# Patient Record
Sex: MALE | Race: BLACK OR AFRICAN AMERICAN | NOT HISPANIC OR LATINO | Employment: UNEMPLOYED | URBAN - METROPOLITAN AREA
[De-identification: names, ages, dates, MRNs, and addresses within clinical notes are randomized per-mention and may not be internally consistent; named-entity substitution may affect disease eponyms.]

---

## 2022-05-08 ENCOUNTER — HOSPITAL ENCOUNTER (EMERGENCY)
Facility: HOSPITAL | Age: 32
Discharge: HOME | End: 2022-05-09
Attending: EMERGENCY MEDICINE
Payer: MEDICAID

## 2022-05-08 DIAGNOSIS — K52.9 PROCTOCOLITIS: ICD-10-CM

## 2022-05-08 DIAGNOSIS — B20 HIV INFECTION, UNSPECIFIED SYMPTOM STATUS: Primary | ICD-10-CM

## 2022-05-08 PROBLEM — Z21 HIV INFECTION: Status: ACTIVE | Noted: 2022-05-08

## 2022-05-08 LAB
ALBUMIN SERPL-MCNC: 3.4 GM/DL (ref 3.5–5)
ALBUMIN/GLOB SERPL: 0.9 RATIO (ref 1.1–2)
ALP SERPL-CCNC: 45 UNIT/L (ref 40–150)
ALT SERPL-CCNC: 17 UNIT/L (ref 0–55)
ANISOCYTOSIS BLD QL SMEAR: ABNORMAL
APPEARANCE UR: CLEAR
AST SERPL-CCNC: 18 UNIT/L (ref 5–34)
BACTERIA #/AREA URNS AUTO: ABNORMAL /HPF
BASOPHILS # BLD AUTO: 0.01 X10(3)/MCL (ref 0–0.2)
BASOPHILS NFR BLD AUTO: 0.1 %
BILIRUB UR QL STRIP.AUTO: NEGATIVE MG/DL
BILIRUBIN DIRECT+TOT PNL SERPL-MCNC: 0.2 MG/DL (ref 0–0.5)
BILIRUBIN DIRECT+TOT PNL SERPL-MCNC: 0.2 MG/DL (ref 0–0.8)
BILIRUBIN DIRECT+TOT PNL SERPL-MCNC: 0.4 MG/DL
BUN SERPL-MCNC: 12.8 MG/DL (ref 8.9–20.6)
C TRACH DNA SPEC QL NAA+PROBE: NOT DETECTED
CALCIUM SERPL-MCNC: 9.1 MG/DL (ref 8.4–10.2)
CHLORIDE SERPL-SCNC: 109 MMOL/L (ref 98–107)
CO2 SERPL-SCNC: 27 MMOL/L (ref 22–29)
COLOR UR AUTO: YELLOW
CREAT SERPL-MCNC: 1.13 MG/DL (ref 0.73–1.18)
EOSINOPHIL # BLD AUTO: 0.3 X10(3)/MCL (ref 0–0.9)
EOSINOPHIL NFR BLD AUTO: 3.3 %
ERYTHROCYTE [DISTWIDTH] IN BLOOD BY AUTOMATED COUNT: 18.9 % (ref 11.5–17)
GLOBULIN SER-MCNC: 3.7 GM/DL (ref 2.4–3.5)
GLUCOSE SERPL-MCNC: 83 MG/DL (ref 74–100)
GLUCOSE UR QL STRIP.AUTO: NEGATIVE MG/DL
HCT VFR BLD AUTO: 43.3 % (ref 42–52)
HGB BLD-MCNC: 13.6 GM/DL (ref 14–18)
HIV 1+2 AB+HIV1 P24 AG SERPL QL IA: REACTIVE
IMM GRANULOCYTES # BLD AUTO: 0.03 X10(3)/MCL (ref 0–0.02)
IMM GRANULOCYTES NFR BLD AUTO: 0.3 % (ref 0–0.43)
KETONES UR QL STRIP.AUTO: NEGATIVE MG/DL
LEUKOCYTE ESTERASE UR QL STRIP.AUTO: ABNORMAL UNIT/L
LYMPHOCYTES # BLD AUTO: 2.89 X10(3)/MCL (ref 0.6–4.6)
LYMPHOCYTES NFR BLD AUTO: 31.3 %
MCH RBC QN AUTO: 22.5 PG (ref 27–31)
MCHC RBC AUTO-ENTMCNC: 31.4 MG/DL (ref 33–36)
MCV RBC AUTO: 71.7 FL (ref 80–94)
MICROCYTES BLD QL SMEAR: ABNORMAL
MONOCYTES # BLD AUTO: 0.96 X10(3)/MCL (ref 0.1–1.3)
MONOCYTES NFR BLD AUTO: 10.4 %
N GONORRHOEA DNA SPEC QL NAA+PROBE: NOT DETECTED
NEUTROPHILS # BLD AUTO: 5 X10(3)/MCL (ref 2.1–9.2)
NEUTROPHILS NFR BLD AUTO: 54.6 %
NITRITE UR QL STRIP.AUTO: NEGATIVE
NRBC BLD AUTO-RTO: 0 %
PH UR STRIP.AUTO: 6 [PH]
PLATELET # BLD AUTO: 228 X10(3)/MCL (ref 130–400)
PLATELET # BLD EST: ADEQUATE 10*3/UL
PMV BLD AUTO: 10.9 FL (ref 9.4–12.4)
POIKILOCYTOSIS BLD QL SMEAR: ABNORMAL
POTASSIUM SERPL-SCNC: 4.9 MMOL/L (ref 3.5–5.1)
PROT SERPL-MCNC: 7.1 GM/DL (ref 6.4–8.3)
PROT UR QL STRIP.AUTO: NEGATIVE MG/DL
RBC # BLD AUTO: 6.04 X10(6)/MCL (ref 4.7–6.1)
RBC #/AREA URNS AUTO: ABNORMAL /HPF
RBC MORPH BLD: ABNORMAL
RBC UR QL AUTO: NEGATIVE UNIT/L
RPR SER QL: ABNORMAL
RPR SER QL: REACTIVE
RPR SER-TITR: ABNORMAL {TITER}
SODIUM SERPL-SCNC: 142 MMOL/L (ref 136–145)
SP GR UR STRIP.AUTO: 1.01 (ref 1–1.03)
SQUAMOUS #/AREA URNS AUTO: ABNORMAL /LPF
TARGETS BLD QL SMEAR: ABNORMAL
UROBILINOGEN UR STRIP-ACNC: 1 MG/DL
WBC # SPEC AUTO: 9.2 X10(3)/MCL (ref 4.5–11.5)
WBC #/AREA URNS AUTO: >=100 /HPF

## 2022-05-08 PROCEDURE — 87591 N.GONORRHOEAE DNA AMP PROB: CPT | Performed by: PHYSICIAN ASSISTANT

## 2022-05-08 PROCEDURE — 86702 HIV-2 ANTIBODY: CPT | Performed by: EMERGENCY MEDICINE

## 2022-05-08 PROCEDURE — 99285 EMERGENCY DEPT VISIT HI MDM: CPT | Mod: 25

## 2022-05-08 PROCEDURE — 87088 URINE BACTERIA CULTURE: CPT | Performed by: PHYSICIAN ASSISTANT

## 2022-05-08 PROCEDURE — 81003 URINALYSIS AUTO W/O SCOPE: CPT | Performed by: PHYSICIAN ASSISTANT

## 2022-05-08 PROCEDURE — 86592 SYPHILIS TEST NON-TREP QUAL: CPT | Performed by: EMERGENCY MEDICINE

## 2022-05-08 PROCEDURE — 36415 COLL VENOUS BLD VENIPUNCTURE: CPT | Performed by: PHYSICIAN ASSISTANT

## 2022-05-08 PROCEDURE — 87491 CHLMYD TRACH DNA AMP PROBE: CPT | Performed by: PHYSICIAN ASSISTANT

## 2022-05-08 PROCEDURE — 85025 COMPLETE CBC W/AUTO DIFF WBC: CPT | Performed by: PHYSICIAN ASSISTANT

## 2022-05-08 PROCEDURE — 36415 COLL VENOUS BLD VENIPUNCTURE: CPT | Performed by: EMERGENCY MEDICINE

## 2022-05-08 PROCEDURE — 80053 COMPREHEN METABOLIC PANEL: CPT | Performed by: PHYSICIAN ASSISTANT

## 2022-05-08 PROCEDURE — 25500020 PHARM REV CODE 255: Performed by: EMERGENCY MEDICINE

## 2022-05-08 PROCEDURE — 81015 MICROSCOPIC EXAM OF URINE: CPT | Performed by: PHYSICIAN ASSISTANT

## 2022-05-08 PROCEDURE — 87389 HIV-1 AG W/HIV-1&-2 AB AG IA: CPT | Performed by: EMERGENCY MEDICINE

## 2022-05-08 RX ADMIN — IOPAMIDOL 100 ML: 755 INJECTION, SOLUTION INTRAVENOUS at 09:05

## 2022-05-08 NOTE — FIRST PROVIDER EVALUATION
"Medical screening exam completed.  I have conducted a focused provider triage encounter, findings are as follows:    Chief Complaint   Patient presents with    Rectal Bleeding     Rectal pain and bleeding x2 weeks. States mainly when he has a BM, but will occasionally just pass clots.  Went to an UC and was told he didn't have hemorrhoids. No blood thinner use.       Brief history of present illness:  31 y.o. male presents to the ED with rectal pain and bleeding for the last 6 weeks. States he was seen at [facility] 3 weeks ago, and told he didn't have hemorrhoids. Notes pain with each bowel movement as well as intermittent dysuria, says the pain is radiating to both LE. Denies fever, chills. FERMIN Thomas    Vitals:    05/08/22 1825   BP: 129/69   BP Location: Left arm   Patient Position: Sitting   Pulse: 77   Resp: 18   Temp: 98.6 °F (37 °C)   TempSrc: Oral   SpO2: 99%   Height: 6' 2" (1.88 m)       Pertinent physical exam: Awake, alert, ambulatory, non-labored respirations    Brief workup plan:  Labs, UA    Preliminary workup initiated; this workup will be continued and followed by the physician or advanced practice provider that is assigned to the patient when roomed.  "

## 2022-05-09 VITALS
HEIGHT: 74 IN | TEMPERATURE: 99 F | SYSTOLIC BLOOD PRESSURE: 132 MMHG | HEART RATE: 63 BPM | RESPIRATION RATE: 18 BRPM | DIASTOLIC BLOOD PRESSURE: 67 MMHG | OXYGEN SATURATION: 97 %

## 2022-05-09 PROCEDURE — 63600175 PHARM REV CODE 636 W HCPCS: Mod: JG | Performed by: EMERGENCY MEDICINE

## 2022-05-09 PROCEDURE — 96372 THER/PROPH/DIAG INJ SC/IM: CPT | Performed by: EMERGENCY MEDICINE

## 2022-05-09 RX ORDER — CIPROFLOXACIN 500 MG/1
500 TABLET ORAL EVERY 12 HOURS
Qty: 28 TABLET | Refills: 0 | Status: SHIPPED | OUTPATIENT
Start: 2022-05-09 | End: 2022-05-23

## 2022-05-09 RX ORDER — METRONIDAZOLE 500 MG/1
500 TABLET ORAL EVERY 12 HOURS
Qty: 28 TABLET | Refills: 0 | Status: SHIPPED | OUTPATIENT
Start: 2022-05-09 | End: 2022-05-23

## 2022-05-09 RX ADMIN — PENICILLIN G BENZATHINE 2.4 MILLION UNITS: 1200000 INJECTION, SUSPENSION INTRAMUSCULAR at 01:05

## 2022-05-09 NOTE — ED PROVIDER NOTES
Encounter Date: 5/8/2022       History     Chief Complaint   Patient presents with    Rectal Bleeding     Rectal pain and bleeding x6 weeks. States mainly when he has a BM, but will occasionally just pass clots.  Went to an UC and was told he didn't have hemorrhoids. No blood thinner use.     32 yo AAM c/o rectal bleeding for about 6 weeks, as well as pain radiating down his right leg, and generalized weakness.  Pt does have MSM, receives rectal intercourse, unprotected, has a hx of syphillis but is unsure if it has been treated or not.  On exam - benign neuro, no obvious rectal bleeding or external hemorrhoids.  Pt maew.          Review of patient's allergies indicates:  No Known Allergies  History reviewed. No pertinent past medical history.  History reviewed. No pertinent surgical history.  No family history on file.  Social History     Tobacco Use    Smoking status: Light Tobacco Smoker     Types: Cigarettes    Smokeless tobacco: Never Used   Substance Use Topics    Alcohol use: Never    Drug use: Never     Review of Systems   Constitutional: Negative for fever.   HENT: Negative for sore throat.    Respiratory: Negative for shortness of breath.    Cardiovascular: Negative for chest pain.   Gastrointestinal: Negative for nausea.   Genitourinary: Negative for dysuria.   Musculoskeletal: Negative for back pain.   Skin: Negative for rash.   Neurological: Positive for weakness.   Hematological: Does not bruise/bleed easily.       Physical Exam     Initial Vitals [05/08/22 1825]   BP Pulse Resp Temp SpO2   129/69 77 18 98.6 °F (37 °C) 99 %      MAP       --         Physical Exam    Constitutional: He appears well-developed and well-nourished. No distress.   HENT:   Head: Normocephalic and atraumatic.   Eyes: Conjunctivae and EOM are normal. Pupils are equal, round, and reactive to light. Right eye exhibits no discharge. Left eye exhibits no discharge. No scleral icterus.   Neck: No tracheal deviation present.    Cardiovascular: Normal rate, regular rhythm, normal heart sounds and intact distal pulses.   No murmur heard.  Pulmonary/Chest: Breath sounds normal. No stridor. No respiratory distress. He has no wheezes. He has no rales.   Abdominal: Abdomen is soft. He exhibits no distension. There is no abdominal tenderness. There is no rebound and no guarding.   Musculoskeletal:         General: No tenderness or edema. Normal range of motion.     Neurological: He is alert and oriented to person, place, and time. He has normal strength and normal reflexes. No cranial nerve deficit.   Skin: Skin is warm and dry. No rash noted. No erythema. No pallor.   Psychiatric: He has a normal mood and affect. His behavior is normal. Judgment and thought content normal.         ED Course   Procedures  Labs Reviewed   COMPREHENSIVE METABOLIC PANEL - Abnormal; Notable for the following components:       Result Value    Chloride 109 (*)     Albumin Level 3.4 (*)     Globulin 3.7 (*)     Albumin/Globulin Ratio 0.9 (*)     All other components within normal limits   URINALYSIS, REFLEX TO URINE CULTURE - Abnormal; Notable for the following components:    Leukocyte Esterase, UA Large (*)     All other components within normal limits   CBC WITH DIFFERENTIAL - Abnormal; Notable for the following components:    Hgb 13.6 (*)     MCV 71.7 (*)     MCH 22.5 (*)     MCHC 31.4 (*)     RDW 18.9 (*)     IG# 0.03 (*)     All other components within normal limits   BLOOD SMEAR MICROSCOPIC EXAM (OLG) - Abnormal; Notable for the following components:    RBC Morph Abnormal (*)     Anisocyte 1+ (*)     Microcyte 1+ (*)     Poik 1+ (*)     Target Cell 1+ (*)     All other components within normal limits   URINALYSIS, MICROSCOPIC - Abnormal; Notable for the following components:    WBC, UA >=100 (*)     Bacteria, UA Few (*)     All other components within normal limits   RPR - Abnormal; Notable for the following components:    RPR Reactive (*)     RPR Titer 1:32 (*)      All other components within normal limits   HIV 1 / 2 ANTIBODY - Abnormal; Notable for the following components:    HIV Reactive (*)     All other components within normal limits   CHLAMYDIA/GONORRHOEAE(GC), PCR - Normal   CULTURE, URINE   CBC W/ AUTO DIFFERENTIAL    Narrative:     The following orders were created for panel order CBC auto differential.  Procedure                               Abnormality         Status                     ---------                               -----------         ------                     CBC with Differential[[id number]]        Abnormal            Final result                 Please view results for these tests on the individual orders.   HIV 1/2 ANTIBODY DIFFERENTIATION          Imaging Results          CT Abdomen Pelvis With Contrast (Preliminary result)  Result time 05/08/22 21:07:44   Procedure changed from CTA Abdomen and Pelvis     Preliminary result by Roldan Ponce MD (05/08/22 21:07:44)                 Narrative:    START OF REPORT:  Technique: CT of the abdomen and pelvis was performed with axial images as well as sagittal and coronal reconstruction images with intravenous contrast.    Comparison: None available.    Clinical History: Rectal bleeding, MSM, Rectal abscess.    Dosage Information: Automated Exposure Control was utilized.    Findings:  Lines and Tubes: None.  Thorax:  Lungs: The visualized lung bases appear unremarkable.  Pleura: No effusions or thickening are seen. No pneumothorax is seen in the visualized lung bases.  Heart: The heart size is within normal limits.  Abdomen:  Abdominal Wall: No abdominal wall pathology is seen.  Liver: The liver appears unremarkable.  Biliary System: No intrahepatic or extrahepatic biliary duct dilatation is seen.  Gallbladder: The gallbladder appears unremarkable.  Pancreas: The pancreas appears unremarkable.  Spleen: The spleen appears unremarkable.  Adrenals: The adrenal glands appear unremarkable.  Kidneys: The  kidneys appear unremarkable with no stones cysts masses or hydronephrosis.  Aorta: The abdominal aorta appears unremarkable.  IVC: Unremarkable.  Bowel:  Esophagus: The visualized distal esophagus appears unremarkable.  Stomach: The stomach is nondistended limiting the evaluation for wall thickening.  Duodenum: Unremarkable appearing duodenum.  Small Bowel: The small bowel appears unremarkable.  Colon: There is mild circumferential wall thickening of the inferior rectum and anal canal. There are a few associatedprominent lymph nodes (series 2, images 76-83).  Appendix: The appendix appears unremarkable and is seen on âImage 40, Series 2â through âImage 51, Series 2â.  Peritoneum: No intraperitoneal free air or ascites is seen.    Pelvis:  Bladder: The bladder is nondistended however the bladder wall appears thickened after considering state of nondistension which could reflect an element of cystitis.  Male:  Prostate gland: The prostate gland appears unremarkable.  Inguinal Findings: Incidental note is made of a few prominent inguinal lymph nodes bilaterally.      Impression:  1. There is mild circumferential wall thickening of the inferior rectum and anal canal. There are a few associatedprominent lymph nodes (series 2, images 76-83). These findings suggest possible proctocolitis without definitive abscess formation identified on this study. Correlate clinically as regards additional evaluation and follow up.  2. The bladder is nondistended however the bladder wall appears thickened after considering state of nondistension which could reflect an element of cystitis. Correlate with clinical and laboratory findings.  3. Details and other findings as discussed above.                                X-Rays:   Independently Interpreted Readings:   Other Readings:  START OF REPORT:  Technique: CT of the abdomen and pelvis was performed with axial images as well as sagittal and coronal reconstruction images with  intravenous contrast.     Comparison: None available.     Clinical History: Rectal bleeding, MSM, Rectal abscess.     Dosage Information: Automated Exposure Control was utilized.     Findings:  Lines and Tubes: None.  Thorax:  Lungs: The visualized lung bases appear unremarkable.  Pleura: No effusions or thickening are seen. No pneumothorax is seen in the visualized lung bases.  Heart: The heart size is within normal limits.  Abdomen:  Abdominal Wall: No abdominal wall pathology is seen.  Liver: The liver appears unremarkable.  Biliary System: No intrahepatic or extrahepatic biliary duct dilatation is seen.  Gallbladder: The gallbladder appears unremarkable.  Pancreas: The pancreas appears unremarkable.  Spleen: The spleen appears unremarkable.  Adrenals: The adrenal glands appear unremarkable.  Kidneys: The kidneys appear unremarkable with no stones cysts masses or hydronephrosis.  Aorta: The abdominal aorta appears unremarkable.  IVC: Unremarkable.  Bowel:  Esophagus: The visualized distal esophagus appears unremarkable.  Stomach: The stomach is nondistended limiting the evaluation for wall thickening.  Duodenum: Unremarkable appearing duodenum.  Small Bowel: The small bowel appears unremarkable.  Colon: There is mild circumferential wall thickening of the inferior rectum and anal canal. There are a few associatedprominent lymph nodes (series 2, images 76-83).  Appendix: The appendix appears unremarkable and is seen on âImage 40, Series 2â through âImage 51, Series 2â.  Peritoneum: No intraperitoneal free air or ascites is seen.     Pelvis:  Bladder: The bladder is nondistended however the bladder wall appears thickened after considering state of nondistension which could reflect an element of cystitis.  Male:  Prostate gland: The prostate gland appears unremarkable.  Inguinal Findings: Incidental note is made of a few prominent inguinal lymph nodes bilaterally.        Impression:  1. There is mild  circumferential wall thickening of the inferior rectum and anal canal. There are a few associatedprominent lymph nodes (series 2, images 76-83). These findings suggest possible proctocolitis without definitive abscess formation identified on this study. Correlate clinically as regards additional evaluation and follow up.  2. The bladder is nondistended however the bladder wall appears thickened after considering state of nondistension which could reflect an element of cystitis. Correlate with clinical and laboratory findings.  3. Details and other findings as discussed above.        This result has not been signed. Information might be incomplete.          Exam Ended: 05/08/22 21:07 Last Resulted: 05/08/22 21:07            Medications   penicillin G benzathine (BICILLIN LA) injection 2.4 Million Units (has no administration in time range)   iopamidoL (ISOVUE-370) injection 100 mL (100 mLs Intravenous Given 5/8/22 2111)     Medical Decision Making:   I spoke with the hospitalist Dr. Milner about this patient's new diagnosis of HIV and also his history of possibly untreated syphilis they recommend Bicillin 2.4 million units IM now and did not start anti-retroviral treatment right now due to the risk of resistance.  I will leave a consult for the morning for case management to get him emergently into the clinic at [facility] for follow-up and the start of his anti-retroviral treatment.  Will also treat him with Cipro and Flagyl for possible proctocolitis.  I did advise him of safe sex practices that he should inform any partners that he has recently had that they need to be checked for HIV and sexually transmitted disease.                      Clinical Impression:   Final diagnoses:  [B20] HIV infection, unspecified symptom status (Primary)  [K52.9] Proctocolitis          ED Disposition Condition    Discharge Stable        ED Prescriptions     Medication Sig Dispense Start Date End Date Auth. Provider    ciprofloxacin HCl  (CIPRO) 500 MG tablet Take 1 tablet (500 mg total) by mouth every 12 (twelve) hours. for 14 days 28 tablet 5/9/2022 5/23/2022 Stephen Damon MD    metroNIDAZOLE (FLAGYL) 500 MG tablet Take 1 tablet (500 mg total) by mouth every 12 (twelve) hours. for 14 days 28 tablet 5/9/2022 5/23/2022 Stephen Damon MD        Follow-up Information     Follow up With Specialties Details Why Contact Info    [facility]  In 1 day  [address] [id number] [phone]             Stephen Damon MD  05/09/22 0111

## 2022-05-09 NOTE — ED NOTES
Assumed care of 32 yo M C/O rectal pain x6 weeks with rectal bleeding x4 weeks. States he was seen at urgent care x3 weeks ago and was scheduled for a CT scan which he has not received yet. Skin color appropriate for ethnicity free of bruising, rash, or injury. Conjunctiva pink/moist. Cap refill < 3 sec. AAOx4. Bed locked in lowest position and side rails up x2. No acute changes in VS. NADN. Will continue to monitor.

## 2022-05-09 NOTE — ED NOTES
Pt resting in ED stretcher with bed locked in lowest position and side rails up x2. No acute changes in VS. NADN. Will continue to monitor.

## 2022-05-10 ENCOUNTER — TELEPHONE (OUTPATIENT)
Dept: INFECTIOUS DISEASES | Facility: CLINIC | Age: 32
End: 2022-05-10
Payer: MEDICAID

## 2022-05-10 DIAGNOSIS — B20 HIV DISEASE: ICD-10-CM

## 2022-05-10 DIAGNOSIS — A53.9 SYPHILIS: Primary | ICD-10-CM

## 2022-05-10 LAB — BACTERIA UR CULT: NO GROWTH

## 2022-05-10 NOTE — TELEPHONE ENCOUNTER
Received referral from [facility] ER for newly diagnosed B20 and syphilis. Attempted to contact patient to schedule B20 intake and to schedule next Bicillin injection on 05/16/2022. No answer. Voicemail left to call clinic back.    Bicillin x 1 given in ER on 05/09/2022.    Called and spoke to Tae at [facility] to obtain syphilis history. Patient had a reactive RPR in 2018 with a negative IgG. No other history.

## 2022-05-11 NOTE — TELEPHONE ENCOUNTER
Attempted to contact patient to schedule B20 intake. No answer. Voicemail left to call clinic back.

## 2022-05-12 LAB
HIV 2 AB SERPLBLD QL IA.RAPID: NEGATIVE
HIV1 AB SERPLBLD QL IA.RAPID: POSITIVE

## 2022-05-13 NOTE — TELEPHONE ENCOUNTER
Called and spoke with patient. B20 intake scheduled for Monday 05/16/2022 at 1pm. Patient voiced understanding.

## 2022-05-16 ENCOUNTER — TELEPHONE (OUTPATIENT)
Dept: INFECTIOUS DISEASES | Facility: CLINIC | Age: 32
End: 2022-05-16
Payer: MEDICAID

## 2022-05-16 DIAGNOSIS — B20 HIV DISEASE: Primary | ICD-10-CM

## 2022-05-16 NOTE — TELEPHONE ENCOUNTER
Patient missed B20 intake with CM on 05/16/2022. Attempted to contact patient to reschedule intake. No answer. Voicemail left to call clinic back.

## 2022-05-17 NOTE — TELEPHONE ENCOUNTER
Attempted to contact patient to reschedule intake appointment with CM. No answer. Voicemail left to call clinic back.

## 2022-05-19 NOTE — TELEPHONE ENCOUNTER
Attempted to contact patient to schedule B20 intake. No answer. Voicemail left to call clinic back. Unable to reach letter mailed to patient.

## 2022-05-20 NOTE — TELEPHONE ENCOUNTER
Patient called clinic wanting to reschedule intake appointment. [place]. Intake rescheduled for 05/23/2022 at 10am. Patient voiced understanding.

## 2022-06-10 NOTE — TELEPHONE ENCOUNTER
Patient missed 05/23/2022 intake appointment. Attempted to contact patient to reschedule. No answer. Voicemail left to call clinic back.

## 2022-06-20 NOTE — TELEPHONE ENCOUNTER
Attempted to contact patient to reschedule intake appointment. No answer. Voicemail left to call clinic back.

## 2022-07-07 NOTE — TELEPHONE ENCOUNTER
Attempted to contact patient to reschedule appointment. Patient's phone not accepting calls at this time. Unable to reach letter mailed to patient.

## 2022-10-06 NOTE — TELEPHONE ENCOUNTER
Patient called the clinic back. He is scheduled for intake on 10/10/2022 at 1pm. Called and spoke to Kate (Linkage to Care Coordinator) to inform her of appt date and time so she can set up patient's transportation. Kate voiced understanding.

## 2022-10-10 ENCOUNTER — CLINICAL SUPPORT (OUTPATIENT)
Dept: INFECTIOUS DISEASES | Facility: CLINIC | Age: 32
End: 2022-10-10
Payer: MEDICAID

## 2022-10-10 ENCOUNTER — HOSPITAL ENCOUNTER (OUTPATIENT)
Facility: HOSPITAL | Age: 32
Discharge: HOME | End: 2022-10-11
Attending: INTERNAL MEDICINE | Admitting: INTERNAL MEDICINE
Payer: MEDICAID

## 2022-10-10 DIAGNOSIS — B20 SYMPTOMATIC HIV INFECTION: ICD-10-CM

## 2022-10-10 DIAGNOSIS — B20 HIV DISEASE: ICD-10-CM

## 2022-10-10 DIAGNOSIS — A53.9 SYPHILIS: ICD-10-CM

## 2022-10-10 DIAGNOSIS — B20 HIV INFECTION, UNSPECIFIED SYMPTOM STATUS: Primary | ICD-10-CM

## 2022-10-10 LAB
ALBUMIN SERPL-MCNC: 4.2 GM/DL (ref 3.5–5)
ALBUMIN/GLOB SERPL: 0.8 RATIO (ref 1.1–2)
ALP SERPL-CCNC: 52 UNIT/L (ref 40–150)
ALT SERPL-CCNC: 25 UNIT/L (ref 0–55)
AMPHET UR QL SCN: POSITIVE
APPEARANCE UR: CLEAR
AST SERPL-CCNC: 30 UNIT/L (ref 5–34)
BACTERIA #/AREA URNS AUTO: NORMAL /HPF
BARBITURATE SCN PRESENT UR: NEGATIVE
BASOPHILS # BLD AUTO: 0.02 X10(3)/MCL (ref 0–0.2)
BASOPHILS NFR BLD AUTO: 0.2 %
BENZODIAZ UR QL SCN: NEGATIVE
BILIRUB UR QL STRIP.AUTO: NEGATIVE MG/DL
BILIRUBIN DIRECT+TOT PNL SERPL-MCNC: 1 MG/DL
BUN SERPL-MCNC: 12.7 MG/DL (ref 8.9–20.6)
C TRACH DNA SPEC QL NAA+PROBE: NOT DETECTED
CALCIUM SERPL-MCNC: 9.8 MG/DL (ref 8.4–10.2)
CANNABINOIDS UR QL SCN: POSITIVE
CHLORIDE SERPL-SCNC: 100 MMOL/L (ref 98–107)
CHOLEST SERPL-MCNC: 199 MG/DL
CHOLEST/HDLC SERPL: 5 {RATIO} (ref 0–5)
CO2 SERPL-SCNC: 29 MMOL/L (ref 22–29)
COCAINE UR QL SCN: NEGATIVE
COLOR UR AUTO: NORMAL
CREAT SERPL-MCNC: 1.04 MG/DL (ref 0.73–1.18)
CRYPTOC AG SER QL IA.RAPID: NEGATIVE
CRYPTOC AG TITR CSF IA: NORMAL {TITER}
DEPRECATED CALCIDIOL+CALCIFEROL SERPL-MC: 29.7 NG/ML (ref 30–80)
EOSINOPHIL # BLD AUTO: 0.06 X10(3)/MCL (ref 0–0.9)
EOSINOPHIL NFR BLD AUTO: 0.7 %
ERYTHROCYTE [DISTWIDTH] IN BLOOD BY AUTOMATED COUNT: 18.7 % (ref 11.5–17)
EST. AVERAGE GLUCOSE BLD GHB EST-MCNC: 96.8 MG/DL
FENTANYL UR QL SCN: NEGATIVE
GFR SERPLBLD CREATININE-BSD FMLA CKD-EPI: >60 MLS/MIN/1.73/M2
GLOBULIN SER-MCNC: 5 GM/DL (ref 2.4–3.5)
GLUCOSE SERPL-MCNC: 88 MG/DL (ref 74–100)
GLUCOSE UR QL STRIP.AUTO: NORMAL MG/DL
HAV AB SER QL IA: NONREACTIVE
HAV IGM SERPL QL IA: NONREACTIVE
HBA1C MFR BLD: 5 %
HBV CORE AB SERPL QL IA: NONREACTIVE
HBV SURFACE AB SER-ACNC: 4.35 MIU/ML
HBV SURFACE AB SERPL IA-ACNC: NONREACTIVE M[IU]/ML
HBV SURFACE AG SERPL QL IA: NONREACTIVE
HCT VFR BLD AUTO: 44.8 % (ref 42–52)
HCV AB SERPL QL IA: NONREACTIVE
HDLC SERPL-MCNC: 41 MG/DL (ref 35–60)
HGB BLD-MCNC: 14.6 GM/DL (ref 14–18)
HYALINE CASTS #/AREA URNS LPF: NORMAL /LPF
IMM GRANULOCYTES # BLD AUTO: 0.02 X10(3)/MCL (ref 0–0.04)
IMM GRANULOCYTES NFR BLD AUTO: 0.2 %
KETONES UR QL STRIP.AUTO: NEGATIVE MG/DL
LDLC SERPL CALC-MCNC: 139 MG/DL (ref 50–140)
LEUKOCYTE ESTERASE UR QL STRIP.AUTO: NEGATIVE UNIT/L
LYMPHOCYTES # BLD AUTO: 2.1 X10(3)/MCL (ref 0.6–4.6)
LYMPHOCYTES NFR BLD AUTO: 25 %
MCH RBC QN AUTO: 23.2 PG (ref 27–31)
MCHC RBC AUTO-ENTMCNC: 32.6 MG/DL (ref 33–36)
MCV RBC AUTO: 71.1 FL (ref 80–94)
MDMA UR QL SCN: NEGATIVE
MONOCYTES # BLD AUTO: 1 X10(3)/MCL (ref 0.1–1.3)
MONOCYTES NFR BLD AUTO: 11.9 %
N GONORRHOEA DNA SPEC QL NAA+PROBE: NOT DETECTED
NEUTROPHILS # BLD AUTO: 5.2 X10(3)/MCL (ref 2.1–9.2)
NEUTROPHILS NFR BLD AUTO: 62 %
NITRITE UR QL STRIP.AUTO: NEGATIVE
NRBC BLD AUTO-RTO: 0 %
OPIATES UR QL SCN: NEGATIVE
PCP UR QL: NEGATIVE
PH UR STRIP.AUTO: 6 [PH]
PH UR: 6 [PH] (ref 3–11)
PLATELET # BLD AUTO: 247 X10(3)/MCL (ref 130–400)
PMV BLD AUTO: 11 FL (ref 7.4–10.4)
POTASSIUM SERPL-SCNC: 3.9 MMOL/L (ref 3.5–5.1)
PROT SERPL-MCNC: 9.2 GM/DL (ref 6.4–8.3)
PROT UR QL STRIP.AUTO: NEGATIVE MG/DL
RBC # BLD AUTO: 6.3 X10(6)/MCL (ref 4.7–6.1)
RBC #/AREA URNS AUTO: NORMAL /HPF
RBC UR QL AUTO: NEGATIVE UNIT/L
SODIUM SERPL-SCNC: 138 MMOL/L (ref 136–145)
SP GR UR STRIP.AUTO: 1.02
SPECIFIC GRAVITY, URINE AUTO (.000) (OHS): 1.02 (ref 1–1.03)
SQUAMOUS #/AREA URNS LPF: NORMAL /HPF
T PALLIDUM AB SER QL: REACTIVE
TRIGL SERPL-MCNC: 97 MG/DL (ref 34–140)
TSH SERPL-ACNC: 1.13 UIU/ML (ref 0.35–4.94)
UROBILINOGEN UR STRIP-ACNC: NORMAL MG/DL
VLDLC SERPL CALC-MCNC: 19 MG/DL
WBC # SPEC AUTO: 8.4 X10(3)/MCL (ref 4.5–11.5)
WBC #/AREA URNS AUTO: NORMAL /HPF

## 2022-10-10 PROCEDURE — G0378 HOSPITAL OBSERVATION PER HR: HCPCS

## 2022-10-10 PROCEDURE — 83036 HEMOGLOBIN GLYCOSYLATED A1C: CPT

## 2022-10-10 PROCEDURE — 86709 HEPATITIS A IGM ANTIBODY: CPT

## 2022-10-10 PROCEDURE — 87899 AGENT NOS ASSAY W/OPTIC: CPT

## 2022-10-10 PROCEDURE — 30000890 MAYO GENERIC ORDERABLE

## 2022-10-10 PROCEDURE — 99212 OFFICE O/P EST SF 10 MIN: CPT | Mod: PBBFAC

## 2022-10-10 PROCEDURE — 82306 VITAMIN D 25 HYDROXY: CPT

## 2022-10-10 PROCEDURE — 80307 DRUG TEST PRSMV CHEM ANLYZR: CPT

## 2022-10-10 PROCEDURE — 87536 HIV-1 QUANT&REVRSE TRNSCRPJ: CPT

## 2022-10-10 PROCEDURE — 82955 ASSAY OF G6PD ENZYME: CPT

## 2022-10-10 PROCEDURE — 86706 HEP B SURFACE ANTIBODY: CPT

## 2022-10-10 PROCEDURE — 86592 SYPHILIS TEST NON-TREP QUAL: CPT

## 2022-10-10 PROCEDURE — 63600175 PHARM REV CODE 636 W HCPCS

## 2022-10-10 PROCEDURE — 96372 THER/PROPH/DIAG INJ SC/IM: CPT

## 2022-10-10 PROCEDURE — 86480 TB TEST CELL IMMUN MEASURE: CPT

## 2022-10-10 PROCEDURE — 36415 COLL VENOUS BLD VENIPUNCTURE: CPT

## 2022-10-10 PROCEDURE — 80061 LIPID PANEL: CPT

## 2022-10-10 PROCEDURE — 84443 ASSAY THYROID STIM HORMONE: CPT

## 2022-10-10 PROCEDURE — 86708 HEPATITIS A ANTIBODY: CPT

## 2022-10-10 PROCEDURE — 81381 HLA I TYPING 1 ALLELE HR: CPT

## 2022-10-10 PROCEDURE — 86704 HEP B CORE ANTIBODY TOTAL: CPT

## 2022-10-10 PROCEDURE — 87340 HEPATITIS B SURFACE AG IA: CPT

## 2022-10-10 PROCEDURE — 87491 CHLMYD TRACH DNA AMP PROBE: CPT | Mod: 59

## 2022-10-10 PROCEDURE — 81001 URINALYSIS AUTO W/SCOPE: CPT

## 2022-10-10 PROCEDURE — 87591 N.GONORRHOEAE DNA AMP PROB: CPT

## 2022-10-10 PROCEDURE — 80053 COMPREHEN METABOLIC PANEL: CPT

## 2022-10-10 PROCEDURE — 86780 TREPONEMA PALLIDUM: CPT

## 2022-10-10 PROCEDURE — 86644 CMV ANTIBODY: CPT

## 2022-10-10 PROCEDURE — 86778 TOXOPLASMA ANTIBODY IGM: CPT

## 2022-10-10 PROCEDURE — 86361 T CELL ABSOLUTE COUNT: CPT

## 2022-10-10 PROCEDURE — 0219U NFCT AGT HIV GNRJ SEQ ALYS: CPT

## 2022-10-10 PROCEDURE — 86803 HEPATITIS C AB TEST: CPT

## 2022-10-10 PROCEDURE — 99285 EMERGENCY DEPT VISIT HI MDM: CPT | Mod: 25,27

## 2022-10-10 PROCEDURE — 85025 COMPLETE CBC W/AUTO DIFF WBC: CPT

## 2022-10-10 RX ORDER — ENOXAPARIN SODIUM 100 MG/ML
40 INJECTION SUBCUTANEOUS EVERY 24 HOURS
Status: DISCONTINUED | OUTPATIENT
Start: 2022-10-10 | End: 2022-10-11 | Stop reason: HOSPADM

## 2022-10-10 RX ORDER — NALOXONE HCL 0.4 MG/ML
0.02 VIAL (ML) INJECTION
Status: DISCONTINUED | OUTPATIENT
Start: 2022-10-10 | End: 2022-10-11 | Stop reason: HOSPADM

## 2022-10-10 RX ORDER — SODIUM CHLORIDE 0.9 % (FLUSH) 0.9 %
10 SYRINGE (ML) INJECTION EVERY 12 HOURS PRN
Status: DISCONTINUED | OUTPATIENT
Start: 2022-10-10 | End: 2022-10-11 | Stop reason: HOSPADM

## 2022-10-10 RX ADMIN — ENOXAPARIN SODIUM 40 MG: 40 INJECTION SUBCUTANEOUS at 07:10

## 2022-10-10 NOTE — PROGRESS NOTES
B20 Intake    Patient presents to clinic for HIV intake. He was diagnosed on 05/08/2022 while at [facility] ER for rectal bleeding. He was also diagnosed with a syphilis RPR 1:32 at that time. He was given Bicillin x 1 on 05/09/2022. Attempted to contact patient several times for intake and to get him set up for Bicillin injections but was unsuccessful. Kate, Linkage to Care Coordinator, set up intake appointment today. Patient states he has never been tested for HIV before 05/2022. He has only had STD testing. Patient is not in a relationship with anyone at this time. He has had about 30 lifetime partners, both male and female. Protection not used. He has had 4 partners within the last year, 3 female and 1 male. Protection not used. Patient c/o headaches for a few days, blurry vision, balance issues, white patches in mouth, and chest pain that comes and goes. Denies rash, new lesions, or discharge. He admits to meth and marijuana use today. He is fidgety, does make eye contact, mumbles his words, and does not complete some of his sentences. Notified Julia Orta NP of patient's s/s. She stated for patient to report to the ER for eval for possible LP with the s/s he is having. Called Laurie, charge nurse in ER, to inform her that patient was being sent. Understanding voiced. Patient escorted to ER. No distress noted.     *Antonieta with [facility]PH present after intake to complete her intake.     HIV Test Date: 05/08/2022       Location: [facility] ER    CD4: drawn today  Viral Load: drawn today    Current ARV Therapy: none at this time    Who knows of status: no one    Marital Status:  SINGLE        Children: 4    Risk Factors:   Blood Transfusion: denies   IVDA: denies; admits to marijuana and meth, last use today   Tattoos: arm, professionally done; chest, not professionally done   Piercings: ear (closed up), professionally done    Hx of STD: syphilis (Bicillin  x 1 on 05/09/2022); patient thinks gonorrhea and trich    Previous  Opportunistic Infection: Oral Candidiasis      PMH: denies    PSH: denies    Mental Health Issues: denies    ETOH Use: yes, 1-3 per week (beer and gin)    Incarceration:  yes, 5-6 times       How Long?:  longest was 4 months long         [facility]    Pending Warrants: denies      Discussed clinic flow, disease process, including potential for resistance, and treatment goals.  Stressed importance of medication adherence, keeping appointments, and using safe sex practices.  Encouraged to maintain, good health, hygiene, and nutrition.  Follow up appointment set with Julia Orta NP on 10/20/2022 at 10:30am.  Directed patient to [facility] to have lab work done.

## 2022-10-10 NOTE — ED PROVIDER NOTES
"Encounter Date: 10/10/2022       History     Chief Complaint   Patient presents with    Headache     Pt to ed c/o headache x 3 days that come and go. Pt reports he is having dysuria and possible STD     Pt is a 32 y.o. male who presents to the [facility] ED for evaluation. Reports headache and "weight loss." Pt diagnosed with HIV in May 2022. Had not followed up with ID Clinic until earlier today. Pt sent from clinic to ED for evaluation due to pt's symptoms of headache, fidgeting, mumbled speech. Pt admits to amphetamine and THC use. Denies chest pain, SOB, weakness, dizziness, or fever. Headache symptoms intermittent x 3 days.     The history is provided by the patient.   Review of patient's allergies indicates:   Allergen Reactions    Ibuprofen Anaphylaxis     Past Medical History:   Diagnosis Date    HIV infection      No past surgical history on file.  No family history on file.  Social History     Tobacco Use    Smoking status: Light Smoker     Packs/day: 0.25     Types: Cigarettes    Smokeless tobacco: Never   Substance Use Topics    Alcohol use: Yes     Alcohol/week: 1.0 - 3.0 standard drink     Types: 1 - 3 Drinks containing 0.5 oz of alcohol per week    Drug use: Yes     Types: Methamphetamines, Marijuana     Comment: last use today     Review of Systems   Constitutional:  Positive for unexpected weight change. Negative for chills, diaphoresis, fatigue and fever.   HENT:  Negative for facial swelling, postnasal drip, rhinorrhea, sinus pressure, sinus pain, sore throat and trouble swallowing.    Respiratory:  Negative for cough, chest tightness, shortness of breath and wheezing.    Cardiovascular:  Negative for chest pain, palpitations and leg swelling.   Gastrointestinal:  Negative for abdominal pain, diarrhea, nausea and vomiting.   Genitourinary:  Negative for dysuria, flank pain, hematuria and urgency.   Musculoskeletal:  Negative for arthralgias, back pain and myalgias.   Skin:  Negative for color change and " rash.   Neurological:  Positive for headaches. Negative for dizziness, syncope and weakness.   Hematological:  Does not bruise/bleed easily.   All other systems reviewed and are negative.    Physical Exam     Initial Vitals [10/10/22 1448]   BP Pulse Resp Temp SpO2   (!) 145/78 86 16 97.9 °F (36.6 °C) 100 %      MAP       --         Physical Exam    Nursing note and vitals reviewed.  Constitutional: Vital signs are normal. He appears well-developed and well-nourished.   HENT:   Head: Normocephalic.   Nose: Mucosal edema and rhinorrhea present.   Mouth/Throat: Oral lesions present. Posterior oropharyngeal erythema present. No oropharyngeal exudate or tonsillar abscesses.   White patches to tongue. Consistent with candidiasis   Eyes: Conjunctivae and EOM are normal. Pupils are equal, round, and reactive to light.   Neck: Neck supple.   Normal range of motion.  Cardiovascular:  Normal rate, regular rhythm, normal heart sounds and intact distal pulses.           Pulmonary/Chest: Effort normal and breath sounds normal. No respiratory distress. He has no wheezes. He has no rhonchi. He has no rales. He exhibits no tenderness.   Abdominal: Abdomen is soft and flat. Bowel sounds are normal. There is no abdominal tenderness. There is no rebound, no guarding, no tenderness at McBurney's point and negative Johnson's sign.   Musculoskeletal:         General: Normal range of motion.      Cervical back: Normal range of motion and neck supple.     Neurological: He is alert and oriented to person, place, and time. He has normal strength.   Skin: Skin is warm and dry. Capillary refill takes less than 2 seconds.   Psychiatric: Judgment and thought content normal. His mood appears anxious. His speech is rapid and/or pressured. He is hyperactive. Cognition and memory are normal.       ED Course   Procedures  Labs Reviewed   CRYPTOCOCCAL ANTIGEN, BLOOD          Imaging Results              CT Head Without Contrast (Final result)  Result  time 10/10/22 16:04:35      Final result by Candelario Sun MD (10/10/22 16:04:35)                   Impression:      No acute abnormality seen      Electronically signed by: Candelario Sun  Date:    10/10/2022  Time:    16:04               Narrative:    EXAMINATION:  CT HEAD WITHOUT CONTRAST    CLINICAL HISTORY:  Headache, sudden, severe;    TECHNIQUE:  Multiple axial images were obtained from the base of the brain to the vertex without contrast administration.  Sagittal and coronal reconstructions were performed. .Automatic exposure control  (AEC) is utilized to reduce patient radiation exposure.    COMPARISON:  None    FINDINGS:  There is no intracranial mass or lesion seen.  No hemorrhage is seen.  No infarct is seen.  The ventricles and basilar cisterns appear normal.  Brain parenchyma appears grossly unremarkable.    Posterior fossa appears normal.  The calvarium is intact.  The paranasal sinuses appear grossly unremarkable.                                    X-Rays:   Independently Interpreted Readings:   Head CT: No hemorrhage.  No skull fracture.  No acute stroke.  The calcified pineal gland is midline.   Medications - No data to display  Medical Decision Making:   History:   Old Records Summarized: other records.       <> Summary of Records: Pt had full panel of diagnostic testing drawn immediately prior to presentation to ED by Infectious Disease Clinic. I havre added additional testing for pt while in ED and have called the [facility] Lab Department to ensure pt's results will be available for ED review.   Differential Diagnosis:   HIV  Electrolyte imbalance  Anemia  Substance abuse  Thrush  Clinical Tests:   Lab Tests: Ordered and Reviewed  Radiological Study: Ordered and Reviewed          Attending Attestation:     Physician Attestation Statement for NP/PA:   I have conducted a face to face encounter with this patient in addition to the NP/PA, due to NP/PA Request    Other NP/PA Attestation  Additions:    History of Present Illness: Recently Dx with HIV and syphilis, referred to ED for possible LP due to headaches   Physical Exam: Unremarkable   Medical Decision Making: Pt admits to polysubstance abuse, HCT unremarkable. Will consult due to the recent diagnosis with RPR positive for LP           ED Course as of 10/10/22 1623   Mon Oct 10, 2022   1549 Pt care transitioned to KARI Manjarrez MD at this time. [JA]      ED Course User Index  [JA] Louie Lane Jr., FNP                 Clinical Impression:   Final diagnoses:  [B20] HIV infection, unspecified symptom status (Primary)  [A53.9] Syphilis      ED Disposition Condition    Observation Stable                Fran Roy MD  10/10/22 0418

## 2022-10-10 NOTE — H&P
[facility] Internal Medicine H and P    Attending: Eun Ulrich MD    Resident: Daniel Licona DO    Intern: Nico Silveira MD    Chief complaint:  Headache    HPI:  32-year-old male with the past medical history of HIV and syphilis both diagnosed in May of 2022 and methamphetamine abuse who presents to the ED complaining of having a generalized headache for the past 3 days.  Associated blurry vision.  Patient also reports some unintentional weight loss.  He reports having a mild cough and occasionally spits up mucus. Patient denies fever, chills, nausea, vomiting, diarrhea, rash, chest pain, shortness a breath, numbness, weakness, tingling.  Patient reports being evaluated and diagnosed with HIV and syphilis in May of 2022.  Patient was noncompliant follow-up appointments and has not been on antiretroviral therapy since diagnosis.  Additionally, patient did receive 1 Bicillin shot for syphilis treatment however discontinued therapy afterwards.  Patient does not report having a recent infection or any recent illness.  Of note, patient does admit to snorting methamphetamine prior to arrival today.  He reports snorting methamphetamine once or twice a week and denies any IV injection.  He admits to smoking marijuana and states that he does not use any other drugs.    ROS:  Pertinent positives include headache, blurry vision, unintentional weight loss.  Pertinent negatives include chest pain, shortness a breath, fever, chills, nausea, vomiting, diarrhea, numbness, weakness, tingling, dysuria, hematuria, abdominal pain, rash.  All other systems reviewed and negative.    Social history:  Patient admits to methamphetamine abuse once or twice weekly.  No alcohol use.  Admits to marijuana use.  No other drug use.  Patient lives at home with his child.  He reports being able to take care of his child.    Surgical history:  None.    Family history:  Unknown.    Physical exam:  Vitals:    10/10/22 1448   BP: (!) 145/78   Pulse: 86    Resp: 16   Temp: 97.9 °F (36.6 °C)     Healthy-appearing male in no apparent distress.  Nontoxic and non-ill-appearing.  Cardiovascular exam reveals regular rate and rhythm.  No murmurs, rubs, or gallops.  No JVD.  Abdominal exam reveals no tenderness, guarding, rebound, or distention.  No hepatosplenomegaly.  Pulmonary exam reveals no wheezing, rales, or rhonchi.  Chest effort is normal.  HEENT exam reveals extraocular movements are intact.  Pupils are equal, round, and reactive to light.  Oropharynx is clear.  White patches on tongue.  No meningismus  Musculoskeletal exam reveals no edema, warmth, swelling.  Normal range of motion.  Skin exam reveals no rashes.  Neurological exam reveals sensation intact bilaterally.  Alert and oriented x3.  GCS of 15.  Psychological exam reveals a odd affect.  Otherwise normal judgment and behavior.    CBC:  Recent Labs   Lab 10/10/22  1402   WBC 8.4   HGB 14.6   HCT 44.8      MCV 71.1*   RDW 18.7*     CMP:  Recent Labs   Lab 10/10/22  1402   K 3.9   CO2 29   BUN 12.7   CREATININE 1.04   ALBUMIN 4.2   BILITOT 1.0   AST 30   ALKPHOS 52   ALT 25     Coags:   Recent Labs   Lab 10/10/22  1402   LABPROT 9.2*     FLP:   Recent Labs   Lab 10/10/22  1402   CHOL 199   HDL 41   TRIG 97     DM:   Recent Labs   Lab 10/10/22  1402   HGBA1C 5.0   CREATININE 1.04     Thyroid:   Recent Labs   Lab 10/10/22  1402   TSH 1.1325     Anemia: No results for input(s): IRON, TIBC, FERRITIN, [id number], FOLATE in the last 168 hours.  Pulm:   No results for input(s): PO2, FIO2, PEEP in the last 168 hours.   Urinalysis:   Lab Results   Component Value Date    LABURIN No Growth 05/08/2022    COLORU YELLOW 01/18/2020    PHUR 6.0 01/18/2020    APPEARANCEUA Clear 10/10/2022    NITRITE - 01/18/2020    PROTEINUA Negative 10/10/2022    GLUCUA - 01/18/2020    KETONESU 20 (A) 01/18/2020    UROBILINOGEN Normal 10/10/2022    BILIRUBINUA Negative 10/10/2022    OCCULTUA - 01/18/2020    RBCUA 0-5 10/10/2022     WBCUA 0-5 10/10/2022       Trended Cardiac Data:  No results for input(s): TROPONINI, CKTOTAL, CKMB, BNP in the last 168 hours.    Assessment and plan:  HIV diagnosed in May of 2022  -patient is not on any antiretroviral therapy as he was noncompliant with follow-up appointments.  -CD4 count pending  -HIV screening reactive  -patient is being screened for TB, toxoplasmosis, G6PD, CMV  -urinalysis clean  -gonorrhea/chlamydia screening negative    Syphilis diagnosis in May of 2022  -received 1 Bicillin injection but did not follow up to receive the rest of the course  -there is some worry for neurosyphilis as the patient did not complete the full penicillin regimen and is having headache and blurry vision  -will re-initiate Bicillin injection course  -syphilis antibody reactive;  RPR titer 1:32  -plan for lumbar puncture in the morning, this is non-emergent    Headache/blurry vision  -idiopathic  -lumbar puncture will be performed in the morning  -meningitis is on the differential however the patient does not have a fever, white count, or meningismus    Polysubstance abuse  -patient tested positive for methamphetamine and marijuana  -patient admits to methamphetamine use prior to arrival  -will educate the patient on the dangers drug use  -supportive care if patient begins to withdrawal overnight    Disposition:  Admit patient to observation overnight with a plan of doing a lumbar puncture in the morning.  Initiate Bicillin regimen to treat syphilis.  Schedule follow-up appointment with infectious disease so patient could start on anti-retroviral therapy.  Plan on discharge tomorrow if labs are negative and LP shows no signs of infection    Nico Silveira MD  [facility] Internal Medicine, PGY-1

## 2022-10-11 VITALS
HEIGHT: 74 IN | OXYGEN SATURATION: 99 % | DIASTOLIC BLOOD PRESSURE: 51 MMHG | SYSTOLIC BLOOD PRESSURE: 116 MMHG | RESPIRATION RATE: 16 BRPM | BODY MASS INDEX: 26.88 KG/M2 | WEIGHT: 209.44 LBS | TEMPERATURE: 98 F | HEART RATE: 69 BPM

## 2022-10-11 LAB
AGE: 32
ALBUMIN SERPL-MCNC: 3.7 GM/DL (ref 3.5–5)
ALBUMIN/GLOB SERPL: 0.9 RATIO (ref 1.1–2)
ALP SERPL-CCNC: 46 UNIT/L (ref 40–150)
ALT SERPL-CCNC: 22 UNIT/L (ref 0–55)
AST SERPL-CCNC: 25 UNIT/L (ref 5–34)
BASOPHILS # BLD AUTO: 0.03 X10(3)/MCL (ref 0–0.2)
BASOPHILS NFR BLD AUTO: 0.4 %
BILIRUBIN DIRECT+TOT PNL SERPL-MCNC: 0.8 MG/DL
BUN SERPL-MCNC: 11.7 MG/DL (ref 8.9–20.6)
CALCIUM SERPL-MCNC: 9 MG/DL (ref 8.4–10.2)
CD3+CD4+ CELLS # BLD: 25 % (ref 28–48)
CD3+CD4+ CELLS # SPEC: 268.8 UNIT/L (ref 589–1505)
CD3+CD4+ CELLS NFR BLD: 12.8 %
CHLORIDE SERPL-SCNC: 106 MMOL/L (ref 98–107)
CO2 SERPL-SCNC: 24 MMOL/L (ref 22–29)
CREAT SERPL-MCNC: 0.9 MG/DL (ref 0.73–1.18)
EOSINOPHIL # BLD AUTO: 0.18 X10(3)/MCL (ref 0–0.9)
EOSINOPHIL NFR BLD AUTO: 2.3 %
ERYTHROCYTE [DISTWIDTH] IN BLOOD BY AUTOMATED COUNT: 18.6 % (ref 11.5–17)
GFR SERPLBLD CREATININE-BSD FMLA CKD-EPI: >60 MLS/MIN/1.73/M2
GLOBULIN SER-MCNC: 4 GM/DL (ref 2.4–3.5)
GLUCOSE SERPL-MCNC: 84 MG/DL (ref 74–100)
HCT VFR BLD AUTO: 43.3 % (ref 42–52)
HGB BLD-MCNC: 13.7 GM/DL (ref 14–18)
IMM GRANULOCYTES # BLD AUTO: 0.03 X10(3)/MCL (ref 0–0.04)
IMM GRANULOCYTES NFR BLD AUTO: 0.4 %
LYMPHOCYTES # BLD AUTO: 2.3 X10(3)/MCL (ref 0.6–4.6)
LYMPHOCYTES # BLD AUTO: 2100 X10(3)/MCL (ref 1260–5520)
LYMPHOCYTES NFR BLD AUTO: 29.4 %
LYMPHOMA - T-CELL MARKERS SPEC-IMP: ABNORMAL
MCH RBC QN AUTO: 22.8 PG (ref 27–31)
MCHC RBC AUTO-ENTMCNC: 31.6 MG/DL (ref 33–36)
MCV RBC AUTO: 72 FL (ref 80–94)
MONOCYTES # BLD AUTO: 0.97 X10(3)/MCL (ref 0.1–1.3)
MONOCYTES NFR BLD AUTO: 12.4 %
NEUTROPHILS # BLD AUTO: 4.3 X10(3)/MCL (ref 2.1–9.2)
NEUTROPHILS NFR BLD AUTO: 55.1 %
NRBC BLD AUTO-RTO: 0 %
PLATELET # BLD AUTO: 231 X10(3)/MCL (ref 130–400)
PMV BLD AUTO: 11.2 FL (ref 7.4–10.4)
POTASSIUM SERPL-SCNC: 4.9 MMOL/L (ref 3.5–5.1)
PROT SERPL-MCNC: 7.7 GM/DL (ref 6.4–8.3)
RBC # BLD AUTO: 6.01 X10(6)/MCL (ref 4.7–6.1)
RPR SER QL: ABNORMAL
RPR SER QL: REACTIVE
RPR SER-TITR: ABNORMAL {TITER}
SODIUM SERPL-SCNC: 139 MMOL/L (ref 136–145)
WBC # BLD AUTO: 8400 /MM3 (ref 4500–11500)
WBC # SPEC AUTO: 7.8 X10(3)/MCL (ref 4.5–11.5)

## 2022-10-11 PROCEDURE — 62270 DX LMBR SPI PNXR: CPT

## 2022-10-11 PROCEDURE — G0378 HOSPITAL OBSERVATION PER HR: HCPCS

## 2022-10-11 PROCEDURE — 36415 COLL VENOUS BLD VENIPUNCTURE: CPT

## 2022-10-11 PROCEDURE — 85025 COMPLETE CBC W/AUTO DIFF WBC: CPT

## 2022-10-11 PROCEDURE — 63600175 PHARM REV CODE 636 W HCPCS: Mod: JG

## 2022-10-11 PROCEDURE — 80053 COMPREHEN METABOLIC PANEL: CPT

## 2022-10-11 PROCEDURE — 96372 THER/PROPH/DIAG INJ SC/IM: CPT

## 2022-10-11 RX ADMIN — PENICILLIN G BENZATHINE 2.4 MILLION UNITS: 1200000 INJECTION, SUSPENSION INTRAMUSCULAR at 03:10

## 2022-10-11 NOTE — PROCEDURES
Lumbar Puncture    Date/Time: 10/10/2022 3:03 PM  Location procedure was performed: [facility] INTERNAL MEDICINE RESIDENTS  Performed by: Isra Marsh MD  Authorized by: Eun Thompson MD   Assisting provider: Joey Licona DO  Pre-operative diagnosis:  Syphilis  Post-operative diagnosis: Syphilis  Consent Done: Yes  Indications: evaluation for altered mental status    Anesthesia:  Local Anesthetic: lidocaine 1% with epinephrine    Patient sedated: no  Description of findings: Unable to obtain CSF samples   Preparation: Patient was prepped and draped in the usual sterile fashion.  Lumbar space: L4-L5 interspace  Patient's position: sitting  Needle gauge: 18  Needle type: federico point  Needle length: 3.5 in  Number of attempts: 1  Post-procedure: site cleaned, pressure dressing applied and adhesive bandage applied  Complications: No  Specimens: No  Implants: No  Patient tolerance: Patient tolerated the procedure well with no immediate complications  Comments: Unable to obtain CSF samples      Isra Marsh MD  Internal Medicine PGY-2

## 2022-10-11 NOTE — NURSING
Pt was given the two injections as ordered. Pt was given discharge instructions and verbalized understanding. Iv removed as per d/c. Pt said he will have to try to find a ride and will let me know when they get here.

## 2022-10-11 NOTE — PLAN OF CARE
Problem: Adult Inpatient Plan of Care  Goal: Plan of Care Review  Outcome: Ongoing, Progressing  Goal: Patient-Specific Goal (Individualized)  Outcome: Ongoing, Progressing  Goal: Absence of Hospital-Acquired Illness or Injury  Outcome: Ongoing, Progressing  Goal: Optimal Comfort and Wellbeing  Outcome: Ongoing, Progressing  Goal: Readiness for Transition of Care  Outcome: Ongoing, Progressing     Problem: Fall Injury Risk  Goal: Absence of Fall and Fall-Related Injury  Outcome: Ongoing, Progressing     Problem: Confusion Acute  Goal: Optimal Cognitive Function  Outcome: Ongoing, Progressing

## 2022-10-11 NOTE — DISCHARGE SUMMARY
[facility] Internal Medicine Discharge Summary    Admitting Physician: Eun Thompson MD  Attending Physician: Eun Thompson MD  Date of Admit: 10/10/2022  Date of Discharge: 10/11/2022    Condition: Good  Outcome: Condition has improved and patient is now ready for discharge.  DISPOSITION: Home or Self Care    Discharge Diagnoses     Patient Active Problem List   Diagnosis    Proctocolitis    HIV (human immunodeficiency virus infection)    Syphilis       Principal Problem:  HIV (human immunodeficiency virus infection)    Consultants and Procedures     Consultants:  IP CONSULT TO HOSPITAL MEDICINE    Procedures:   * No surgery found *     Brief Admission History     32-year-old male with the past medical history of HIV and syphilis both diagnosed in May of 2022 and methamphetamine abuse who presents to the ED complaining of having a generalized headache for the past 3 days.  Associated blurry vision.  Patient also reports some unintentional weight loss.  He reports having a mild cough and occasionally spits up mucus. Patient denies fever, chills, nausea, vomiting, diarrhea, rash, chest pain, shortness a breath, numbness, weakness, tingling.  Patient reports being evaluated and diagnosed with HIV and syphilis in May of 2022.  Patient was noncompliant follow-up appointments and has not been on antiretroviral therapy since diagnosis.  Additionally, patient did receive 1 Bicillin shot for syphilis treatment however discontinued therapy afterwards.  Patient does not report having a recent infection or any recent illness.  Of note, patient does admit to snorting methamphetamine prior to arrival today.  He reports snorting methamphetamine once or twice a week and denies any IV injection.  He admits to smoking marijuana and states that he does not use any other drugs.       Hospital Course with Pertinent Findings     Patient was admitted to the internal medicine service for evaluation and treatment of syphilis. Patient's labs  "were positive for syphilis with a titer of 1:32. He has received one shot of bicillin in the past, however, did not complete the three shot regimen. Patient was also found to be positive for HIV. His initial diagnosis of HIV and syphilis was made in May 2022. However, patient was lost to follow-up and never began anti-retroviral therapy. Current CD4 count as of 10/11/2022 is 268.8 with a lymphocyte percentage of 25. Patient had cryptococcus, hepatitis panel, and chlamydia/gonorrhea tests that were all negative. A1C and TSH was within normal limits. Labs pending include HL57R, G6PD, CMV, Toxoplasma, and TB quant all pending. Patient admits to snorting methamphetamine prior to admission and was found to have methamphetamine and THC in his urine. Patient had a lumbar puncture today to rule out neurosyphilis. Lumbar puncture was unsuccessful. Will refer to IR.     Discharge physical exam:  Vitals  BP: 124/74  Temp: 97.3 °F (36.3 °C)  Temp src: Oral  Pulse: (!) 44  Resp: 16  SpO2: (!) 90 %  Height: 6' 2" (188 cm)  Weight: 95 kg (209 lb 7 oz)    General: Awake, alert, & oriented to person, place & time. No acute distress  Psychiatric: Mood and affect normal  HEENT: Normocephalic, atraumatic. Face symmetric.  Cardiovascular: Regular rate & rhythm. Normal S1 & S2 w/out murmurs, rubs or gallops.  No JVD appreciated.  2+ pulses throughout.  Pulmonary: Bilateral symmetric chest rise. Non-labored, no wheezes, rhonchi or crackles are appreciated.  Abdominal:  Soft, nontender, nondistended. Bowel sounds present  Extremities: No clubbing, cyanosis or edema.  Skin:  Exposed skin is warm & dry.  Neuro:   Patient moves all extremities equally. Sensation intact bilateraly.    TIME SPENT ON DISCHARGE: 60 minutes    Discharge Medications        Medication List      You have not been prescribed any medications.         Discharge Information:     Please follow up with infectious disease clinic on 10/20/2022 so you may begin " anti-retroviral therapy as soon as possible. Please complete regimen of bicillin, which will be scheduled for 3 injections 7 days apart. This will be given at your ID appointment. Inform sexual partners of your status so they may be evaluated. Discontinue taking illicit drugs. Follow-up in post wards clinic in one week.     Nico Silveira MD  [facility] Internal Medicine, PGY-1

## 2022-10-11 NOTE — MEDICAL/APP STUDENT
"[facility] INTERNAL MEDICINE  INPATIENT PROGRESS NOTE    Resident Team: [facility] Medicine List 3  Attending Physician:   Hospital Length of Stay: 0     SUBJECTIVE:      HPI: William Girard is a 32 y.o. male with PMH of HIV, syphilis, and polysubstance abuse presented to the ED on 10/10/22 after being seen in infectious disease clinic and noted to have altered mental status.  On admission, he appeared to be alert and oriented to person, place, time. Pt c/o sharp intermittent L-sided HA and episodes of blurry vision for the past 2 weeks. Pt previously diagnosed with HIV and syphilis in May of this year. Pt was noncompliant follow-up appointments and has not been on antiretroviral therapy since diagnosis.  Additionally, patient did receive 1 Bicillin shot for syphilis treatment however discontinued therapy afterwards. Denies recent infection or any illness. Of note, patient does admit to snorting methamphetamine prior to arrival today.  He reports snorting methamphetamine once or twice a week and denies any IV injection.  He admits to smoking marijuana and states that he does not use any other drugs.      Today: There were no overnight events.. Pt states he has not had episodes of HA or blurry vision today. No withdrawal symptoms. Denies any complaints.     ROS:   (+)  (-) Chest pain, SOB, palpitations, fever, night sweat, chills, N/V, diarrhea, constipation, dizziness       OBJECTIVE:     Vital signs:   BP (!) 111/44   Pulse 68   Temp 97.5 °F (36.4 °C)   Resp 16   Ht 6' 2" (1.88 m)   Wt 95 kg (209 lb 7 oz)   SpO2 100%   BMI 26.89 kg/m²      Physical Examination:  General: Well nourished w/o distress  HEENT: NC/AT; PERRL; nasal and oral mucosa moist and clear; no sinus tenderness; no thyromegaly  Neck: Full ROM; no lymphadenopathy  Pulm: CTA bilaterally, normal work of breathing  CV: S1, S2 w/o murmurs or gallops; no edema noted  GI: Soft with normal bowel sounds in all quadrants, no masses on palpation  MSK: Full ROM of " all extremities and spine w/o limitation or discomfort  Derm: No rashes, abnormal bruising, or skin lesions  Neuro: AAOx4; CN II-XII intact; motor/sensory function intact  Psych: Cooperative; appropriate mood and affect    Laboratory:  Most Recent Data:  CBC:   Lab Results   Component Value Date    WBC 7.8 10/11/2022    HGB 13.7 (L) 10/11/2022    HCT 43.3 10/11/2022     10/11/2022    MCV 72.0 (L) 10/11/2022    RDW 18.6 (H) 10/11/2022     WBC Differential:   Recent Labs   Lab 10/10/22  1402 10/11/22  0329   WBC 8.4 7.8   HGB 14.6 13.7*   HCT 44.8 43.3    231   MCV 71.1* 72.0*     BMP:   Lab Results   Component Value Date     10/11/2022    K 4.9 10/11/2022    CO2 24 10/11/2022    BUN 11.7 10/11/2022    CREATININE 0.90 10/11/2022    CALCIUM 9.0 10/11/2022    MG 2.0 12/13/2018     LFTs:   Lab Results   Component Value Date    ALBUMIN 3.7 10/11/2022    BILITOT 0.8 10/11/2022    AST 25 10/11/2022    ALKPHOS 46 10/11/2022    ALT 22 10/11/2022     Coags: No results found for: INR, PROTIME, PTT  FLP:   Lab Results   Component Value Date    CHOL 199 10/10/2022    HDL 41 10/10/2022    TRIG 97 10/10/2022     DM:   Lab Results   Component Value Date    HGBA1C 5.0 10/10/2022    CREATININE 0.90 10/11/2022     Thyroid:   Lab Results   Component Value Date    TSH 1.1325 10/10/2022     Anemia: No results found for: IRON, TIBC, FERRITIN, [id number], FOLATE  Cardiac:   Lab Results   Component Value Date    TROPONINI 0.07 12/13/2018     Urinalysis:   Lab Results   Component Value Date    LABURIN No Growth 05/08/2022    COLORU YELLOW 01/18/2020    PHUA 6.0 10/10/2022    NITRITE - 01/18/2020    KETONESU 20 (A) 01/18/2020    UROBILINOGEN Normal 10/10/2022    WBCUA 0-5 10/10/2022       Imaging:    Current meds:    enoxaparin  40 mg Subcutaneous Daily         ASSESSMENT & PLAN:     Hx of HIV diagnosed May 2022  -Pt is not on any antiretroviral therapy due to noncompliance with f/u appts.  -CD4 count pending  -HIV  screening reactive  -GC screen negative  -Hepatis panel negative  -Cryptococcal panel negative  -Screening for TB, toxoplasmosis, G6PD, CMV      Hx of syphilis diagnosed May 2022  -Pt received 1 Bicillin injection but did not follow up to receive the rest of the course  -There is concern for neurosyphilis as the patient did not complete the full PCN regimen and is having headache and blurry vision  -Will re-initiate Bicillin injection course  -syphilis antibody reactive;  RPR titer 1:32  -plan for lumbar puncture in the morning, this is non-emergent    Headache and blurry vision  -idiopathic  -lumbar puncture will be performed in the morning  -meningitis is on the differential however the patient does not have a fever, white count, or meningismus    Polysubstance abuse  -UDS (+) for methamphetamine and marijuana   -Pt education on negative effects of illicit drug use  -Monitor for withdrawal symptoms   -Naloxone PRN      DVT PPx: Lovenox  GI PPx: none  Diet: Regular  O2: Room air  PCP: Primary Doctor No    Disposition (10/11/2022): Pt was admitted for observation overnight with a plan of doing a lumbar punctre. Initiate Bicillin regimen to treat syphilis.  Schedule follow-up appointment with infectious disease so patient could start on anti-retroviral therapy. Consider discharge today if LP shows no signs of infection.    Francia Vidales, OMS-4

## 2022-10-11 NOTE — PROGRESS NOTES
PGY 2 addendum to H&P    HPI:   Patient is a 32-year-old male with past medical history of HIV disease and syphilis who presents to the emergency department today after being seen in infectious disease clinic and noted to have altered mental status.  He states he does not feel different than normal and appears to be alert and oriented to person, place, time.  Patient states he will occasionally have episode of blurry vision and will occasionally feel a little off balance while ambulating.  Patient previously diagnosed with HIV and syphilis in May of this year.  Never started on antiretroviral therapy, and only received 1 dose of Bicillin for syphilis treatment.  Patient is not a great historian, states he used methamphetamines this morning.  He states he has not noticed new symptoms, he has not had any recent infections and has been feeling like his normal self.  No other medical history patient does not take any other medications.  Patient denies chest pain, shortness of breath, cough, nausea, vomiting.  Patient sent from ID Clinic to ER for further evaluation    Physical exam   Patient in no acute distress, alert and oriented x4  Heart is regular rate rhythm with no murmur   No tenderness on palpation to abdomen   Lungs are clear to auscultation, normal work of breathing  EOMI, PERRLA, no APD noted  Appears to have some erythema over of mucus and mouth.  Possible candidiasis   No obvious deformity, no lower extremity edema   No rashes or new spots on skin  Patient appears to be acutely intoxicated, distracted and hard to understand    Assessment/plan:    HIV disease  Syphilis   New onset headache  Altered mental status versus acute intoxication   Polysubstance abuse  -complete HIV workup ordered during ID appointment to include CD4 count, viral load, toxoplasmosis, CMV, tuberculosis  -awaiting results of most of these  -patient did not finish syphilis treatment in May, will require 1st dose of Bicillin before  discharge  -will plan on lumbar puncture before discharge to rule out neurosyphilis, LP not emergent but patient has been lost to follow-up multiple times  -will re-evaluate mental status in morning, figure out if altered state was secondary to disease or acute intoxication  -stressed importance of close follow-up to patient, importance of taking medicine once started  -counseled patient on drug cessation  -follow-up appointment to start on ART scheduled for 10/20    Joey Licona DO  [employer] IM PGY2

## 2022-10-11 NOTE — PROGRESS NOTES
[facility] Internal Medicine Progress Note    Attending: Eun Ulrich MD    Resident: Daniel Licona DO    Intern: Nico Silveira MD    Chief complaint:  Headache    HPI:  No acute events overnight. Patient reports sleeping well. Headache is intermittent and is localized to the left side of his head now. Blurry vision has resolved. No other associated symptoms. Patient denies fever, chills, nausea, vomiting, diarrhea, chest pain, SOB, abdominal pain, numbness, weakness, and tingling.     ROS:  Pertinent positives include headache.   Pertinent negatives include chest pain, shortness a breath, fever, chills, nausea, vomiting, diarrhea, numbness, weakness, tingling, dysuria, hematuria, abdominal pain, rash.  All other systems reviewed and negative.    Social history:  Patient admits to methamphetamine abuse once or twice weekly.  No alcohol use.  Admits to marijuana use.  No other drug use.  Patient lives at home with his child.  He reports being able to take care of his child.    Surgical history:  None.    Family history:  Unknown.    Physical exam:  Vitals:    10/11/22 0738   BP: (!) 111/44   Pulse: 68   Resp:    Temp: 97.5 °F (36.4 °C)     Healthy-appearing male in no apparent distress.  Nontoxic and non-ill-appearing.  Cardiovascular exam reveals regular rate and rhythm.  No murmurs, rubs, or gallops.  No JVD.  Abdominal exam reveals no tenderness, guarding, rebound, or distention.  No hepatosplenomegaly.  Pulmonary exam reveals no wheezing, rales, or rhonchi.  Chest effort is normal.  HEENT exam reveals extraocular movements are intact.  Pupils are equal, round, and reactive to light.  Oropharynx is clear.  White patches on tongue.  No meningismus  Musculoskeletal exam reveals no edema, warmth, swelling.  Normal range of motion.  Skin exam reveals no rashes.  Neurological exam reveals sensation intact bilaterally.  Alert and oriented x3.  GCS of 15.  Psychological exam reveals a odd affect.  Otherwise normal judgment  and behavior.    CBC:  Recent Labs   Lab 10/10/22  1402 10/11/22  0329   WBC 8.4 7.8   HGB 14.6 13.7*   HCT 44.8 43.3    231   MCV 71.1* 72.0*   RDW 18.7* 18.6*     CMP:  Recent Labs   Lab 10/10/22  1402 10/11/22  0329   K 3.9 4.9   CO2 29 24   BUN 12.7 11.7   CREATININE 1.04 0.90   ALBUMIN 4.2 3.7   BILITOT 1.0 0.8   AST 30 25   ALKPHOS 52 46   ALT 25 22     Coags:   Recent Labs   Lab 10/10/22  1402 10/11/22  0329   LABPROT 9.2* 7.7     FLP:   Recent Labs   Lab 10/10/22  1402   CHOL 199   HDL 41   TRIG 97     DM:   Recent Labs   Lab 10/10/22  1402 10/11/22  0329   HGBA1C 5.0  --    CREATININE 1.04 0.90     Thyroid:   Recent Labs   Lab 10/10/22  1402   TSH 1.1325     Anemia: No results for input(s): IRON, TIBC, FERRITIN, BYHEZPDL31, FOLATE in the last 168 hours.  Pulm:   No results for input(s): PO2, FIO2, PEEP in the last 168 hours.   Urinalysis:   Lab Results   Component Value Date    LABURIN No Growth 05/08/2022    COLORU YELLOW 01/18/2020    PHUR 6.0 01/18/2020    APPEARANCEUA Clear 10/10/2022    NITRITE - 01/18/2020    PROTEINUA Negative 10/10/2022    GLUCUA - 01/18/2020    KETONESU 20 (A) 01/18/2020    UROBILINOGEN Normal 10/10/2022    BILIRUBINUA Negative 10/10/2022    OCCULTUA - 01/18/2020    RBCUA 0-5 10/10/2022    WBCUA 0-5 10/10/2022       Trended Cardiac Data:  No results for input(s): TROPONINI, CKTOTAL, CKMB, BNP in the last 168 hours.    Assessment and plan:  HIV diagnosed in May of 2022  -patient is not on any antiretroviral therapy as he was noncompliant with follow-up appointments.  -CD4 count pending  -HIV screening reactive  -patient is being screened for TB, toxoplasmosis, G6PD, CMV  -Hepatis panel negative  -Cryptococcal panel negative  -A1C is 5  -TSH negative  -urinalysis clean  -gonorrhea/chlamydia screening negative    Syphilis diagnosis in May of 2022  -received 1 Bicillin injection but did not follow up to receive the rest of the course  -there is some worry for neurosyphilis as  the patient did not complete the full penicillin regimen and is having headache and blurry vision  -will re-initiate Bicillin injection course  -syphilis antibody reactive;  RPR titer 1:32  -plan for lumbar puncture in the morning, this is non-emergent    Headache/blurry vision  -idiopathic  -lumbar puncture will be performed in the morning  -meningitis is on the differential however the patient does not have a fever, white count, or meningismus    Polysubstance abuse  -patient tested positive for methamphetamine and marijuana  -patient admits to methamphetamine use prior to arrival  -will educate the patient on the dangers drug use  -supportive care if patient begins to withdrawal overnight    Disposition:  Admit patient to observation overnight with a plan of doing a lumbar puncture.  Initiate Bicillin regimen to treat syphilis.  Schedule follow-up appointment with infectious disease so patient could start on anti-retroviral therapy.  Plan on discharge today if labs are negative and LP shows no signs of infection    Nico Silveira MD  [facility] Internal Medicine, PGY-1

## 2022-10-12 ENCOUNTER — TELEPHONE (OUTPATIENT)
Dept: INFECTIOUS DISEASES | Facility: CLINIC | Age: 32
End: 2022-10-12
Payer: MEDICAID

## 2022-10-12 LAB
CMV IGG SERPL QL IA: POSITIVE
G6PD RBC-CCNT: 11.6 U/G HB (ref 8–11.9)
GAMMA INTERFERON BACKGROUND BLD IA-ACNC: 0.02 IU/ML
HIV1 RNA # PLAS NAA DL=20: ABNORMAL COPIES/ML
M TB IFN-G BLD-IMP: NEGATIVE
M TB IFN-G CD4+ BCKGRND COR BLD-ACNC: 0 IU/ML
M TB IFN-G CD4+CD8+ BCKGRND COR BLD-ACNC: 0 IU/ML
MITOGEN IGNF BCKGRD COR BLD-ACNC: >10 IU/ML
T GONDII IGG SER QL IA: NEGATIVE
T GONDII IGG SER-ACNC: <3 IU/ML
T GONDII IGM SERPL QL IA: NEGATIVE

## 2022-10-12 NOTE — TELEPHONE ENCOUNTER
Informed Julia Orta NP that patient received Bicillin #1 on 10/11/2022 and he is not scheduled to see her until 10/20/2022. She stated Bicillin #2 will be outside of the window if given on 10/87543 and will need to be given on 10/17/2022. She asked if [facility] in [facility] can give Bicillin #2. Called and spoke with Antonieta at [facility] to see if patient can receive Bicillin #2 there. She stated yes and for him to report between 12:30-1pm. Attempted to contact the patient. Phone goes straight to voicemail. Message left for patient to call the clinic back.

## 2022-10-13 LAB — MAYO GENERIC ORDERABLE RESULT: NORMAL

## 2022-10-13 NOTE — TELEPHONE ENCOUNTER
Attempted to contact patient. No answer. Voicemail left to call the clinic back. Text message sent via company phone for patient to call the clinic back.

## 2022-10-14 LAB
ABACAVIR ISLT GENOTYP: NORMAL
ATAZANAVIR+RITONAVIR ISLT GENOTYP: NORMAL
BICTEGRAVIR ISLT GENOTYP: NORMAL
CABOTEGRAVIR ISLT GENOTYP: NORMAL
DARUNAVIR+RITONAVIR ISLT GENOTYP: NORMAL
DIDANOSINE ISLT GENOTYP: NORMAL
DOLUTEGRAVIR ISLT GENOTYP: NORMAL
DORAVIRINE ISLT GENOTYP: NORMAL
EFAVIRENZ ISLT GENOTYP: NORMAL
ELVITEGRAVIR ISLT GENOTYP: NORMAL
EMTRICITABINE ISLT GENOTYP: NORMAL
ETRAVIRINE ISLT GENOTYP: NORMAL
FOSAMPRENAVIR+RITONAVIR ISLT GENOTYP: NORMAL
HIV 1 RNA GENOTYPE ISLT: NORMAL
HIV 1 RNA RT + PR + IN MUT DET PLAS SEQ: NORMAL
HIV NNRTI GENE MUT DET ISLT: NORMAL
HIV NRTI GENE MUT DET ISLT: NORMAL
HIV PI GENE MUT DET ISLT: NORMAL
HIV1 INTEGRASE GENE MUT DET ISLT: NORMAL
INDINAVIR+RITONAVIR ISLT GENOTYP: NORMAL
LAB AOE PNL PATIENT: NORMAL
LAMIVUDINE ISLT GENOTYP: NORMAL
LOPINAVIR+RITONAVIR ISLT GENOTYP: NORMAL
M INTEGRASE FAILED CODONS: NORMAL
M PROTEASE FAILED CODONS: NORMAL
M REVERSE TRANSCRIPTASE FAILED CODONS: NORMAL
NELFINAVIR ISLT GENOTYP: NORMAL
NEVIRAPINE ISLT GENOTYP: NORMAL
RALTEGRAVIR ISLT GENOTYP: NORMAL
RILPIVIRINE ISLT GENOTYP: NORMAL
SAQUINAVIR+RITONAVIR ISLT GENOTYP: NORMAL
STAVUDINE ISLT GENOTYP: NORMAL
TENOFOVIR ISLT GENOTYP: NORMAL
TIPRANAVIR+RITONAVIR ISLT GENOTYP: NORMAL
ZIDOVUDINE ISLT GENOTYP: NORMAL

## 2022-10-17 NOTE — TELEPHONE ENCOUNTER
Called and spoke with patient. Informed him he will need Bicillin #2 on 10/18/2022, not on 10/20/2022 when he sees Julia Orta NP. Offered patient to receive Bicillin #2 at [facility] or the [facility]. Patient stated he would like to go to the [facility]. Instructed patient to report at 1pm on 10/18/2022. Address given to patient. Reminded patient to report to his scheduled provider appt with Julia on 10/20/2022. Patient voiced understanding. Called the [facility]. Appointment for Bicillin #2 set up for tomorrow at 1pm.

## 2022-10-18 ENCOUNTER — TELEPHONE (OUTPATIENT)
Dept: INFECTIOUS DISEASES | Facility: CLINIC | Age: 32
End: 2022-10-18
Payer: MEDICAID

## 2022-10-18 NOTE — TELEPHONE ENCOUNTER
Called Antonieta at [facility] to verify patient report to [facility] for Bicillin #2 injection. Antonieta stated patient did show up and did receive Bicillin #2. He is scheduled for Bicillin #3 next Tuesday 10/25/2022. Understanding voiced.    Patient scheduled to see Julia Orta NP on 10/20/2022.

## 2022-10-21 ENCOUNTER — TELEPHONE (OUTPATIENT)
Dept: INFECTIOUS DISEASES | Facility: CLINIC | Age: 32
End: 2022-10-21
Payer: MEDICAID

## 2022-10-21 NOTE — TELEPHONE ENCOUNTER
Patient no showed for provider appt with Julia Orta NP on 10/20/2022. Attempted to contact patient to reschedule appt. No answer. Voicemail left to call clinic back.     Called and spoke to [employer] to Care Coordinator to let her know patient no showed for provider appt. She voiced understanding.  She will try and reach out to patient.    Called and spoke to Antonieta alejo [facility] to let her know patient no showed for provider appt. She voiced understanding. She will call the clinic on Tuesday 10/25/2022 when patient goes in for Bicillin #3 injection. Understanding voiced.

## 2022-10-25 NOTE — TELEPHONE ENCOUNTER
"Called and spoke with Antonieta at [facility] to see if patient reported for Bicillin #3 injection. Antonieta stated patient "did not show up today. If he comes tomorrow, we can squeeze him in." Attempted to contact patient. Patient not accepting calls at this time.   "

## 2022-10-26 NOTE — TELEPHONE ENCOUNTER
Attempted to contact patient. Phone not accepting calls at this time. Unable to reach letter mailed to patient.    Called and spoke to Antonieta at [facility] to inform her that patient's phone is not accepting calls. She stated she will plan to make a field visit and try to locate patient. Understanding voiced.

## 2022-11-01 NOTE — TELEPHONE ENCOUNTER
Attempted to contact patient to reschedule his provider appt. Number is not reachable at this time.    Antonieta with [facility] called. She stated patient did not go in for Bicillin #3. Antonieta also stated she completed a field visit to patient's home and she was unable locate patient.     Does patient need to restart the Bicillin series?

## 2022-11-30 ENCOUNTER — TELEPHONE (OUTPATIENT)
Dept: INFECTIOUS DISEASES | Facility: CLINIC | Age: 32
End: 2022-11-30
Payer: MEDICAID

## 2022-11-30 NOTE — TELEPHONE ENCOUNTER
Called and spoke to Antonieta at [facility]. She stated patient did receive Bicillin #1 on 11/21/2022 and #2 on 11/28/2022. Patient is to receive Bicillin #3 at his provider appt on Monday 12/05/2022. Called and spoke to patient to remind him that he will receive Bicillin #3 at his provider appt on  Monday. Patient voiced understanding.

## 2022-12-06 NOTE — TELEPHONE ENCOUNTER
Received call from Antonieta. Patient received Bicillin #3 today 12/06/2022. Was able to speak with patient while he was at the health unit with Antonieta. Appointment with Julia Orta NP rescheduled for 12/16/2022 at 10:30am. Patient voiced understanding.

## 2022-12-16 ENCOUNTER — OFFICE VISIT (OUTPATIENT)
Dept: INFECTIOUS DISEASES | Facility: CLINIC | Age: 32
End: 2022-12-16
Payer: MEDICAID

## 2022-12-16 ENCOUNTER — TELEPHONE (OUTPATIENT)
Dept: INFECTIOUS DISEASES | Facility: CLINIC | Age: 32
End: 2022-12-16

## 2022-12-16 VITALS
HEIGHT: 74 IN | SYSTOLIC BLOOD PRESSURE: 118 MMHG | TEMPERATURE: 98 F | BODY MASS INDEX: 29.69 KG/M2 | HEART RATE: 92 BPM | DIASTOLIC BLOOD PRESSURE: 73 MMHG | WEIGHT: 231.31 LBS | RESPIRATION RATE: 16 BRPM

## 2022-12-16 DIAGNOSIS — A09 INFECTIOUS DIARRHEA: ICD-10-CM

## 2022-12-16 DIAGNOSIS — B37.0 ORAL CANDIDIASIS: ICD-10-CM

## 2022-12-16 DIAGNOSIS — Z23 IMMUNIZATION DUE: ICD-10-CM

## 2022-12-16 DIAGNOSIS — K61.1 RECTAL ABSCESS: ICD-10-CM

## 2022-12-16 DIAGNOSIS — F17.210 CIGARETTE NICOTINE DEPENDENCE WITHOUT COMPLICATION: ICD-10-CM

## 2022-12-16 DIAGNOSIS — E55.9 VITAMIN D DEFICIENCY: ICD-10-CM

## 2022-12-16 DIAGNOSIS — Z11.3 ROUTINE SCREENING FOR STI (SEXUALLY TRANSMITTED INFECTION): ICD-10-CM

## 2022-12-16 DIAGNOSIS — B20 HIV DISEASE: Primary | ICD-10-CM

## 2022-12-16 LAB
ALBUMIN SERPL-MCNC: 3.6 G/DL (ref 3.5–5)
ALBUMIN/GLOB SERPL: 0.9 RATIO (ref 1.1–2)
ALP SERPL-CCNC: 49 UNIT/L (ref 40–150)
ALT SERPL-CCNC: 35 UNIT/L (ref 0–55)
AST SERPL-CCNC: 25 UNIT/L (ref 5–34)
BASOPHILS # BLD AUTO: 0.02 X10(3)/MCL (ref 0–0.2)
BASOPHILS NFR BLD AUTO: 0.3 %
BILIRUBIN DIRECT+TOT PNL SERPL-MCNC: 0.7 MG/DL
BUN SERPL-MCNC: 10.5 MG/DL (ref 8.9–20.6)
C TRACH DNA SPEC QL NAA+PROBE: DETECTED
CALCIUM SERPL-MCNC: 9 MG/DL (ref 8.4–10.2)
CHLORIDE SERPL-SCNC: 108 MMOL/L (ref 98–107)
CO2 SERPL-SCNC: 23 MMOL/L (ref 22–29)
CREAT SERPL-MCNC: 0.9 MG/DL (ref 0.73–1.18)
EOSINOPHIL # BLD AUTO: 0.14 X10(3)/MCL (ref 0–0.9)
EOSINOPHIL NFR BLD AUTO: 1.9 %
ERYTHROCYTE [DISTWIDTH] IN BLOOD BY AUTOMATED COUNT: 18.5 % (ref 11.6–14.4)
GFR SERPLBLD CREATININE-BSD FMLA CKD-EPI: >60 MLS/MIN/1.73/M2
GLOBULIN SER-MCNC: 4.2 GM/DL (ref 2.4–3.5)
GLUCOSE SERPL-MCNC: 92 MG/DL (ref 74–100)
HCT VFR BLD AUTO: 43.7 % (ref 42–52)
HGB BLD-MCNC: 13.9 GM/DL (ref 14–18)
IMM GRANULOCYTES # BLD AUTO: 0.02 X10(3)/MCL (ref 0–0.04)
IMM GRANULOCYTES NFR BLD AUTO: 0.3 %
LYMPHOCYTES # BLD AUTO: 1.6 X10(3)/MCL (ref 0.6–4.6)
LYMPHOCYTES NFR BLD AUTO: 21.2 %
MCH RBC QN AUTO: 23 PG
MCHC RBC AUTO-ENTMCNC: 31.8 MG/DL (ref 33–36)
MCV RBC AUTO: 72.4 FL (ref 80–94)
MONOCYTES # BLD AUTO: 0.74 X10(3)/MCL (ref 0.1–1.3)
MONOCYTES NFR BLD AUTO: 9.8 %
N GONORRHOEA DNA SPEC QL NAA+PROBE: DETECTED
NEUTROPHILS # BLD AUTO: 5.02 X10(3)/MCL (ref 2.1–9.2)
NEUTROPHILS NFR BLD AUTO: 66.5 %
NRBC BLD AUTO-RTO: 0 % (ref 0–1)
PLATELET # BLD AUTO: 232 X10(3)/MCL (ref 140–371)
PMV BLD AUTO: 11 FL (ref 9.4–12.4)
POTASSIUM SERPL-SCNC: 4.4 MMOL/L (ref 3.5–5.1)
PROT SERPL-MCNC: 7.8 GM/DL (ref 6.4–8.3)
RBC # BLD AUTO: 6.04 X10(6)/MCL (ref 4.7–6.1)
SODIUM SERPL-SCNC: 138 MMOL/L (ref 136–145)
T PALLIDUM AB SER QL: REACTIVE
WBC # SPEC AUTO: 7.5 X10(3)/MCL (ref 4.5–11.5)

## 2022-12-16 PROCEDURE — 87536 HIV-1 QUANT&REVRSE TRNSCRPJ: CPT | Performed by: NURSE PRACTITIONER

## 2022-12-16 PROCEDURE — 1159F MED LIST DOCD IN RCRD: CPT | Mod: CPTII,,, | Performed by: NURSE PRACTITIONER

## 2022-12-16 PROCEDURE — 90677 PCV20 VACCINE IM: CPT | Mod: PBBFAC

## 2022-12-16 PROCEDURE — 99406 BEHAV CHNG SMOKING 3-10 MIN: CPT | Mod: S$PBB,,, | Performed by: NURSE PRACTITIONER

## 2022-12-16 PROCEDURE — 3074F SYST BP LT 130 MM HG: CPT | Mod: CPTII,,, | Performed by: NURSE PRACTITIONER

## 2022-12-16 PROCEDURE — 86780 TREPONEMA PALLIDUM: CPT | Performed by: NURSE PRACTITIONER

## 2022-12-16 PROCEDURE — 99406 PR TOBACCO USE CESSATION INTERMEDIATE 3-10 MINUTES: ICD-10-PCS | Mod: S$PBB,,, | Performed by: NURSE PRACTITIONER

## 2022-12-16 PROCEDURE — 1160F PR REVIEW ALL MEDS BY PRESCRIBER/CLIN PHARMACIST DOCUMENTED: ICD-10-PCS | Mod: CPTII,,, | Performed by: NURSE PRACTITIONER

## 2022-12-16 PROCEDURE — 87449 NOS EACH ORGANISM AG IA: CPT | Mod: 90 | Performed by: NURSE PRACTITIONER

## 2022-12-16 PROCEDURE — 1160F RVW MEDS BY RX/DR IN RCRD: CPT | Mod: CPTII,,, | Performed by: NURSE PRACTITIONER

## 2022-12-16 PROCEDURE — 3008F PR BODY MASS INDEX (BMI) DOCUMENTED: ICD-10-PCS | Mod: CPTII,,, | Performed by: NURSE PRACTITIONER

## 2022-12-16 PROCEDURE — 99205 OFFICE O/P NEW HI 60 MIN: CPT | Mod: 25,S$PBB,, | Performed by: NURSE PRACTITIONER

## 2022-12-16 PROCEDURE — 86592 SYPHILIS TEST NON-TREP QUAL: CPT | Performed by: NURSE PRACTITIONER

## 2022-12-16 PROCEDURE — 87491 CHLMYD TRACH DNA AMP PROBE: CPT | Performed by: NURSE PRACTITIONER

## 2022-12-16 PROCEDURE — 86361 T CELL ABSOLUTE COUNT: CPT | Performed by: NURSE PRACTITIONER

## 2022-12-16 PROCEDURE — 3078F DIAST BP <80 MM HG: CPT | Mod: CPTII,,, | Performed by: NURSE PRACTITIONER

## 2022-12-16 PROCEDURE — 3078F PR MOST RECENT DIASTOLIC BLOOD PRESSURE < 80 MM HG: ICD-10-PCS | Mod: CPTII,,, | Performed by: NURSE PRACTITIONER

## 2022-12-16 PROCEDURE — 87591 N.GONORRHOEAE DNA AMP PROB: CPT | Performed by: NURSE PRACTITIONER

## 2022-12-16 PROCEDURE — 85025 COMPLETE CBC W/AUTO DIFF WBC: CPT | Performed by: NURSE PRACTITIONER

## 2022-12-16 PROCEDURE — 3074F PR MOST RECENT SYSTOLIC BLOOD PRESSURE < 130 MM HG: ICD-10-PCS | Mod: CPTII,,, | Performed by: NURSE PRACTITIONER

## 2022-12-16 PROCEDURE — 99215 OFFICE O/P EST HI 40 MIN: CPT | Mod: PBBFAC | Performed by: NURSE PRACTITIONER

## 2022-12-16 PROCEDURE — 0219U NFCT AGT HIV GNRJ SEQ ALYS: CPT | Performed by: NURSE PRACTITIONER

## 2022-12-16 PROCEDURE — 36415 COLL VENOUS BLD VENIPUNCTURE: CPT | Performed by: NURSE PRACTITIONER

## 2022-12-16 PROCEDURE — 99205 PR OFFICE/OUTPT VISIT, NEW, LEVL V, 60-74 MIN: ICD-10-PCS | Mod: 25,S$PBB,, | Performed by: NURSE PRACTITIONER

## 2022-12-16 PROCEDURE — 1159F PR MEDICATION LIST DOCUMENTED IN MEDICAL RECORD: ICD-10-PCS | Mod: CPTII,,, | Performed by: NURSE PRACTITIONER

## 2022-12-16 PROCEDURE — 3008F BODY MASS INDEX DOCD: CPT | Mod: CPTII,,, | Performed by: NURSE PRACTITIONER

## 2022-12-16 PROCEDURE — 80053 COMPREHEN METABOLIC PANEL: CPT | Performed by: NURSE PRACTITIONER

## 2022-12-16 RX ORDER — VIT C/E/ZN/COPPR/LUTEIN/ZEAXAN 250MG-90MG
1000 CAPSULE ORAL DAILY
Qty: 30 CAPSULE | Refills: 4 | Status: SHIPPED | OUTPATIENT
Start: 2022-12-16 | End: 2024-02-22 | Stop reason: SDUPTHER

## 2022-12-16 RX ORDER — FLUCONAZOLE 100 MG/1
200 TABLET ORAL DAILY
Qty: 42 TABLET | Refills: 0 | Status: SHIPPED | OUTPATIENT
Start: 2022-12-16 | End: 2023-01-06

## 2022-12-16 NOTE — PROGRESS NOTES
Reviewed labs. Rectal CT/GC is positive for both gonorrhea and chlamydia. Please contact patient with results. Both he and his partner will need to be treated with Rocephin 500 mg IM and Doxycycline 100 mg 1 po BID x 7 days. Please contact patient with results and see when he would like to come in and where and he would like me to send the oral abx to.

## 2022-12-16 NOTE — TELEPHONE ENCOUNTER
----- Message from CHASITY Huff sent at 12/16/2022  2:24 PM CST -----  Reviewed labs. Rectal CT/GC is positive for both gonorrhea and chlamydia. Please contact patient with results. Both he and his partner will need to be treated with Rocephin 500 mg IM and Doxycycline 100 mg 1 po BID x 7 days. Please contact patient with results and see when he would like to come in and where and he would like me to send the oral abx to.

## 2022-12-16 NOTE — TELEPHONE ENCOUNTER
Tried calling pt, no answer, no option to leave voice mail. Sent a text message to pt's phone via office cell phone, will try again later.

## 2022-12-16 NOTE — PROGRESS NOTES
Subjective:       Patient ID: William Girard is a 32 y.o. male.    Chief Complaint: New referral (B20)    William Girard is a 31 y/o AAM here for initial HIV visit.  Dx 5/8/22 while inpatient at [facility] for an anal abscess.  Pt was referred to [facility], but did not attend intake appt until 10/10/22. Then he missed several appts.  He presents today alone.  States he is having a hard time processing this diagnosis. Denies HI or SI. Tells me he has only told one friend that no one in his family is aware of his diagnosis.  Pt is bisexual but prefers men.  Anal receptive sex. >50 partners in his lifetime. No partner at present. Hx of drug use. Tells me he uses crystal meth and THC.  Smokes approx 1 pack of cigarettes per week.  Past hx of syphilis in which patient was treated with Bicillin LA 2.4 million units through the health unit 11/21/22, 11/28/22, and 12/6/22.  Pt also tells he that he has been treated for gonorrhea and trichomoniasis in the past. He denies fever, headache, chills, visual problems, N/V, SOB, cough, chest pain or abd pain. Complains of frequent diarrhea for the last 2 weeks. States every time he eats he has watery diarrhea. Pt also complains of some anal pain intermittently similar to the pain he was having with the anal abscess. He will need to be imaged to make sure this has resolved. Pretreatment labs done 10/10/22 HIV ,000 IN Rilpivirine and Doravarine, HR Sustiva with K103N and V106I mutation, CD4 268, QG neg, CMV IGG +, toxo IGG neg, crypto ag neg, syphilis AB +, RPR 1:8, Vit D 29.7, TSH 1.1325, Hep B surface ag neg, Hep B surface ab NR, Hep B core ab NR, UDS + THC and amphetamines. Pt will need a few updated labs today and will need stool studies as well. He is due for a COVID and flu vaccines today, but is not amenable. Pt is due for PCV20. He is amenable.    I spent a total of 85 minutes on the day of the visit.This includes face to face time and non-face to face time preparing to see the  patient (eg, review of tests), obtaining and/or reviewing separately obtained history, documenting clinical information in the electronic or other health record, independently interpreting results and communicating results to the patient/family/caregiver, or care coordinator.             Review of Systems   Constitutional: Negative.    HENT:          Oral thrush   Eyes: Negative.    Respiratory: Negative.     Cardiovascular: Negative.    Gastrointestinal:  Positive for diarrhea.   Genitourinary: Negative.    Musculoskeletal: Negative.    Integumentary:  Negative.   Neurological: Negative.    Psychiatric/Behavioral: Negative.         Objective:      Physical Exam  Vitals reviewed.   Constitutional:       General: He is not in acute distress.     Appearance: Normal appearance.   HENT:      Head: Normocephalic.      Right Ear: External ear normal.      Left Ear: External ear normal.      Nose: Nose normal.      Mouth/Throat:      Mouth: Mucous membranes are moist.      Pharynx: Oropharynx is clear.      Comments: Oral thrush noted   Eyes:      General: No scleral icterus.     Extraocular Movements: Extraocular movements intact.      Conjunctiva/sclera: Conjunctivae normal.      Pupils: Pupils are equal, round, and reactive to light.   Cardiovascular:      Rate and Rhythm: Normal rate and regular rhythm.      Pulses: Normal pulses.      Heart sounds: Normal heart sounds.   Pulmonary:      Effort: Pulmonary effort is normal. No respiratory distress.      Breath sounds: Normal breath sounds.   Abdominal:      General: Bowel sounds are normal. There is no distension.      Palpations: Abdomen is soft. There is no mass.      Tenderness: There is no abdominal tenderness. There is no right CVA tenderness or left CVA tenderness.      Hernia: No hernia is present.   Musculoskeletal:         General: No tenderness or signs of injury. Normal range of motion.      Cervical back: Normal range of motion and neck supple.      Right  lower leg: No edema.      Left lower leg: No edema.   Lymphadenopathy:      Cervical: No cervical adenopathy.   Skin:     General: Skin is warm and dry.      Capillary Refill: Capillary refill takes less than 2 seconds.      Findings: No erythema or lesion.   Neurological:      General: No focal deficit present.      Mental Status: He is alert and oriented to person, place, and time. Mental status is at baseline.   Psychiatric:         Mood and Affect: Mood normal.         Behavior: Behavior normal.         Thought Content: Thought content normal.         Judgment: Judgment normal.       Assessment:       Problem List Items Addressed This Visit    None  Visit Diagnoses       HIV disease    -  Primary    Relevant Medications    ghzeugvln-hejgmrvj-ccohsdd ala (BIKTARVY) -25 mg (25 kg or greater)    Other Relevant Orders    HIV-1 RNA, Quantitative, PCR with Reflex to Genotype    CD4 Lymphocytes    CBC Auto Differential    Comprehensive Metabolic Panel    CT Abdomen Pelvis With Contrast    Histoplasma antigen, urine    Blastomyces Antigen, Urine    Ambulatory referral/consult to Internal Medicine    Ambulatory referral/consult to Ophthalmology    Vitamin D deficiency        Relevant Medications    cholecalciferol, vitamin D3, (VITAMIN D3) 25 mcg (1,000 unit) capsule    Other Relevant Orders    Ambulatory referral/consult to Internal Medicine    Rectal abscess        Relevant Orders    CT Abdomen Pelvis With Contrast    Oral candidiasis        Relevant Medications    fluconazole (DIFLUCAN) 100 MG tablet    Infectious diarrhea        Relevant Orders    CMV DNA, Quantitative, PCR    Stool Culture    GIARDIA/CRYPTOSPORIDIUM ANTIGEN    Stool Exam-Ova,Cysts,Parasites    Miscellaneous Test, Sendout Stool for AFB smear possible MAC Test ID SAFB    Micropsoridia species, PCR    Miscellaneous Test, Sendout stool for microsporidia    Clostridium Diff Toxin, A & B, EIA    Routine screening for STI (sexually transmitted  infection)        Relevant Orders    SYPHILIS ANTIBODY (WITH REFLEX RPR)    C.trach/N.gonor AMP RNA    Chlamydia/GC, PCR    Cigarette nicotine dependence without complication        Relevant Orders    Ambulatory referral/consult to Internal Medicine    Immunization due        Relevant Orders    (In Office Administered) Pneumococcal Conjugate Vaccine (20 Valent) (IM) (Completed)              Plan:       HIV disease  -     ldimglhna-mjleyzmg-ojwlolb ala (BIKTARVY) -25 mg (25 kg or greater); Take 1 tablet by mouth once daily.  Dispense: 30 tablet; Refill: 2  -     HIV-1 RNA, Quantitative, PCR with Reflex to Genotype; Future; Expected date: 12/16/2022  -     CD4 Lymphocytes; Future; Expected date: 12/16/2022  -     CBC Auto Differential; Future; Expected date: 12/16/2022  -     Comprehensive Metabolic Panel; Future; Expected date: 12/16/2022  -     CT Abdomen Pelvis With Contrast; Future; Expected date: 12/16/2022  -     Histoplasma antigen, urine; Future; Expected date: 12/16/2022  -     Blastomyces Antigen, Urine; Future; Expected date: 12/16/2022    Discussed HIV disease process and treatment at length to include need for 100% medication compliance. Counseled on medication adherence/resistance as well as importance of attending all scheduled follow up appointments/labs. Sexual protection with condoms recommended.  Plan to start Biktarvy 1 po q day as directed   RTC 6 weeks with Julia, labs today   Fundus photo ordered  STI screening collected 12/16/22  COVID and flu precautions discussed   Vaccines recommended     Vitamin D deficiency  Start Vit D 1000 unit once daily.   Pt educated on importance of Vit D to bone and organ health.     Rectal abscess  -     CT Abdomen Pelvis With Contrast; Future; Expected date: 12/16/2022    Oral candidiasis  -     fluconazole (DIFLUCAN) 100 MG tablet; Take 2 tablets (200 mg total) by mouth once daily. for 21 days  Dispense: 42 tablet; Refill: 0    Infectious diarrhea  -      Cancel: Stool Exam-Ova,Cysts,Parasites; Future; Expected date: 12/16/2022  -     Cancel: Stool Culture; Future; Expected date: 12/16/2022  -     Cancel: GIARDIA/CRYPTOSPORIDIUM ANTIGEN; Future; Expected date: 12/16/2022  -     CMV DNA, Quantitative, PCR; Future; Expected date: 12/16/2022  -     Stool Culture; Future; Expected date: 12/16/2022  -     GIARDIA/CRYPTOSPORIDIUM ANTIGEN; Future; Expected date: 12/16/2022  -     Stool Exam-Ova,Cysts,Parasites; Future; Expected date: 12/16/2022  -     Miscellaneous Test, Sendout Stool for AFB smear possible MAC Test ID SAFB; Future; Expected date: 12/16/2022  -     Micropsoridia species, PCR; Future; Expected date: 12/16/2022  -     Miscellaneous Test, Sendout stool for microsporidia; Future; Expected date: 12/16/2022  -     Clostridium Diff Toxin, A & B, EIA    Routine screening for STI (sexually transmitted infection)  -     SYPHILIS ANTIBODY (WITH REFLEX RPR); Future; Expected date: 12/16/2022  -     C.trach/N.gonor AMP RNA; Future; Expected date: 12/16/2022  -     Chlamydia/GC, PCR; Future; Expected date: 12/16/2022    Cigarette nicotine dependence without complication  Spent approx 3 minutes discussing this topic   Smoking cessation encouraged.  Pt is not ready to quit.  Discussed benefits of quitting: improved overall health, decreased cardiac/vascular/pulmonary/stroke risks, as well as cost savings.     Immunization due  -     (In Office Administered) Pneumococcal Conjugate Vaccine (20 Valent) (IM); Future; Expected date: 12/16/2022

## 2022-12-17 LAB
C TRACH RRNA SPEC QL NAA+PROBE: NEGATIVE
CMV DNA SERPL NAA+PROBE-ACNC: NORMAL IU/ML
N GONORRHOEA RRNA SPEC QL NAA+PROBE: NEGATIVE
SPECIMEN SOURCE: NORMAL
SPECIMEN SOURCE: NORMAL

## 2022-12-19 ENCOUNTER — TELEPHONE (OUTPATIENT)
Dept: INFECTIOUS DISEASES | Facility: CLINIC | Age: 32
End: 2022-12-19
Payer: MEDICAID

## 2022-12-19 DIAGNOSIS — B20 AIDS (ACQUIRED IMMUNODEFICIENCY SYNDROME), CD4 <=200: Primary | ICD-10-CM

## 2022-12-19 LAB
AGE: 32
B DERMAT AG UR QL IA: NOT DETECTED
CD3+CD4+ CELLS # BLD: 21 % (ref 28–48)
CD3+CD4+ CELLS # SPEC: 129.15 UNIT/L (ref 589–1505)
CD3+CD4+ CELLS NFR BLD: 8.2 %
HIV1 RNA # PLAS NAA DL=20: ABNORMAL COPIES/ML
LYMPHOCYTES # BLD AUTO: 1575 X10(3)/MCL (ref 1260–5520)
LYMPHOMA - T-CELL MARKERS SPEC-IMP: ABNORMAL
M BLASTOMYCES AG VALUE: NOT DETECTED NG/ML
RPR SER QL: REACTIVE
RPR SER-TITR: ABNORMAL {TITER}
WBC # BLD AUTO: 7500 /MM3 (ref 4500–11500)

## 2022-12-19 RX ORDER — SULFAMETHOXAZOLE AND TRIMETHOPRIM 800; 160 MG/1; MG/1
1 TABLET ORAL DAILY
Qty: 30 TABLET | Refills: 1 | Status: SHIPPED | OUTPATIENT
Start: 2022-12-19 | End: 2024-02-22 | Stop reason: SDUPTHER

## 2022-12-19 NOTE — PROGRESS NOTES
Reviewed labs. CD4 129. Please contact patient with results. He will need to start Bactrim DS 1 po q day. Rx sent to listed pharmacy.

## 2022-12-19 NOTE — TELEPHONE ENCOUNTER
Tried calling pt,   says person trying to reach is not accepting calls at this time, will mail letter for pt to contact office, pt doesn't have a portal. Spoke to Antonieta on Friday she will try and contact pt also.

## 2022-12-20 ENCOUNTER — TELEPHONE (OUTPATIENT)
Dept: INFECTIOUS DISEASES | Facility: CLINIC | Age: 32
End: 2022-12-20
Payer: MEDICAID

## 2022-12-20 NOTE — TELEPHONE ENCOUNTER
Attempted to contact patient. Phone is out of service at this time. JESSICA Cai LPN has been trying to locate patient for other results with no success. Antonieta with [facility] notified that we have been unsuccessful in trying to reach patient. She will try to locate patient.

## 2022-12-20 NOTE — TELEPHONE ENCOUNTER
----- Message from CHASITY Huff sent at 12/19/2022  3:23 PM CST -----  Reviewed labs. CD4 129. Please contact patient with results. He will need to start Bactrim DS 1 po q day. Rx sent to listed pharmacy.

## 2022-12-28 DIAGNOSIS — A09 INFECTIOUS DIARRHEA: Primary | ICD-10-CM

## 2023-04-13 DIAGNOSIS — B20 HIV DISEASE: ICD-10-CM

## 2023-04-17 RX ORDER — BICTEGRAVIR SODIUM, EMTRICITABINE, AND TENOFOVIR ALAFENAMIDE FUMARATE 50; 200; 25 MG/1; MG/1; MG/1
TABLET ORAL
Qty: 30 TABLET | Refills: 0 | Status: SHIPPED | OUTPATIENT
Start: 2023-04-17 | End: 2024-02-22 | Stop reason: SDUPTHER

## 2024-02-22 ENCOUNTER — OFFICE VISIT (OUTPATIENT)
Dept: INFECTIOUS DISEASES | Facility: CLINIC | Age: 34
End: 2024-02-22
Payer: COMMERCIAL

## 2024-02-22 ENCOUNTER — TELEPHONE (OUTPATIENT)
Dept: INFECTIOUS DISEASES | Facility: CLINIC | Age: 34
End: 2024-02-22

## 2024-02-22 VITALS
HEIGHT: 74 IN | RESPIRATION RATE: 16 BRPM | WEIGHT: 213.31 LBS | HEART RATE: 71 BPM | TEMPERATURE: 98 F | BODY MASS INDEX: 27.38 KG/M2 | DIASTOLIC BLOOD PRESSURE: 74 MMHG | SYSTOLIC BLOOD PRESSURE: 130 MMHG

## 2024-02-22 DIAGNOSIS — A53.9 SYPHILIS: ICD-10-CM

## 2024-02-22 DIAGNOSIS — B20 AIDS (ACQUIRED IMMUNODEFICIENCY SYNDROME), CD4 <=200: ICD-10-CM

## 2024-02-22 DIAGNOSIS — B20 AIDS (ACQUIRED IMMUNODEFICIENCY SYNDROME), CD4 <=200/<=14%: Primary | ICD-10-CM

## 2024-02-22 DIAGNOSIS — B20 HIV DISEASE: ICD-10-CM

## 2024-02-22 DIAGNOSIS — K64.4 EXTERNAL HEMORRHOID: ICD-10-CM

## 2024-02-22 DIAGNOSIS — Z11.3 ROUTINE SCREENING FOR STI (SEXUALLY TRANSMITTED INFECTION): ICD-10-CM

## 2024-02-22 DIAGNOSIS — E55.9 VITAMIN D DEFICIENCY: ICD-10-CM

## 2024-02-22 LAB
ABS NEUT CALC (OHS): 2.97 X10(3)/MCL (ref 2.1–9.2)
ALBUMIN SERPL-MCNC: 3.3 G/DL (ref 3.5–5)
ALBUMIN/GLOB SERPL: 0.6 RATIO (ref 1.1–2)
ALP SERPL-CCNC: 52 UNIT/L (ref 40–150)
ALT SERPL-CCNC: 27 UNIT/L (ref 0–55)
ANISOCYTOSIS BLD QL SMEAR: ABNORMAL
APPEARANCE UR: CLEAR
AST SERPL-CCNC: 31 UNIT/L (ref 5–34)
BACTERIA #/AREA URNS AUTO: ABNORMAL /HPF
BILIRUB SERPL-MCNC: 1 MG/DL
BILIRUB UR QL STRIP.AUTO: NEGATIVE
BUN SERPL-MCNC: 9 MG/DL (ref 8.9–20.6)
C TRACH DNA SPEC QL NAA+PROBE: NOT DETECTED
CALCIUM SERPL-MCNC: 8.4 MG/DL (ref 8.4–10.2)
CHLORIDE SERPL-SCNC: 108 MMOL/L (ref 98–107)
CHOLEST SERPL-MCNC: 164 MG/DL
CHOLEST/HDLC SERPL: 4 {RATIO} (ref 0–5)
CO2 SERPL-SCNC: 30 MMOL/L (ref 22–29)
COLOR UR AUTO: YELLOW
CREAT SERPL-MCNC: 0.83 MG/DL (ref 0.73–1.18)
DEPRECATED CALCIDIOL+CALCIFEROL SERPL-MC: 9.9 NG/ML (ref 30–80)
EOSINOPHIL NFR BLD MANUAL: 0.09 X10(3)/MCL (ref 0–0.9)
EOSINOPHIL NFR BLD MANUAL: 2 % (ref 0–8)
ERYTHROCYTE [DISTWIDTH] IN BLOOD BY AUTOMATED COUNT: 17.6 % (ref 11.5–17)
GFR SERPLBLD CREATININE-BSD FMLA CKD-EPI: >60 MLS/MIN/1.73/M2
GLOBULIN SER-MCNC: 5.1 GM/DL (ref 2.4–3.5)
GLUCOSE SERPL-MCNC: 67 MG/DL (ref 74–100)
GLUCOSE UR QL STRIP.AUTO: NORMAL
HCT VFR BLD AUTO: 33.2 % (ref 42–52)
HCV AB SERPL QL IA: NONREACTIVE
HDLC SERPL-MCNC: 41 MG/DL (ref 35–60)
HGB BLD-MCNC: 10.4 G/DL (ref 14–18)
HYALINE CASTS #/AREA URNS LPF: ABNORMAL /LPF
KETONES UR QL STRIP.AUTO: NEGATIVE
LDLC SERPL CALC-MCNC: 103 MG/DL (ref 50–140)
LEUKOCYTE ESTERASE UR QL STRIP.AUTO: NEGATIVE
LYMPHOCYTES NFR BLD MANUAL: 0.95 X10(3)/MCL
LYMPHOCYTES NFR BLD MANUAL: 22 % (ref 13–40)
MCH RBC QN AUTO: 22.9 PG (ref 27–31)
MCHC RBC AUTO-ENTMCNC: 31.3 G/DL (ref 33–36)
MCV RBC AUTO: 73.1 FL (ref 80–94)
MONOCYTES NFR BLD MANUAL: 0.3 X10(3)/MCL (ref 0.1–1.3)
MONOCYTES NFR BLD MANUAL: 7 % (ref 2–11)
N GONORRHOEA DNA SPEC QL NAA+PROBE: NOT DETECTED
NEUTROPHILS NFR BLD MANUAL: 68 % (ref 47–80)
NEUTS BAND NFR BLD MANUAL: 1 % (ref 0–11)
NITRITE UR QL STRIP.AUTO: NEGATIVE
NRBC BLD AUTO-RTO: 0 %
OVALOCYTES (OLG): ABNORMAL
PH UR STRIP.AUTO: 6 [PH]
PLATELET # BLD AUTO: 165 X10(3)/MCL (ref 130–400)
PLATELET # BLD EST: ADEQUATE 10*3/UL
PMV BLD AUTO: 11.8 FL (ref 7.4–10.4)
POTASSIUM SERPL-SCNC: 3.6 MMOL/L (ref 3.5–5.1)
PROT SERPL-MCNC: 8.4 GM/DL (ref 6.4–8.3)
PROT UR QL STRIP.AUTO: NEGATIVE
RBC # BLD AUTO: 4.54 X10(6)/MCL (ref 4.7–6.1)
RBC #/AREA URNS AUTO: ABNORMAL /HPF
RBC MORPH BLD: ABNORMAL
RBC UR QL AUTO: NEGATIVE
RPR SER QL: REACTIVE
RPR SER-TITR: ABNORMAL {TITER}
SODIUM SERPL-SCNC: 144 MMOL/L (ref 136–145)
SOURCE (OHS): NORMAL
SP GR UR STRIP.AUTO: 1.02 (ref 1–1.03)
SQUAMOUS #/AREA URNS LPF: ABNORMAL /HPF
T PALLIDUM AB SER QL: REACTIVE
TARGETS BLD QL SMEAR: SLIGHT
TRIGL SERPL-MCNC: 100 MG/DL (ref 34–140)
TSH SERPL-ACNC: 0.63 UIU/ML (ref 0.35–4.94)
UROBILINOGEN UR STRIP-ACNC: ABNORMAL
VLDLC SERPL CALC-MCNC: 20 MG/DL
WBC # SPEC AUTO: 4.31 X10(3)/MCL (ref 4.5–11.5)
WBC #/AREA URNS AUTO: ABNORMAL /HPF

## 2024-02-22 PROCEDURE — 86480 TB TEST CELL IMMUN MEASURE: CPT | Performed by: NURSE PRACTITIONER

## 2024-02-22 PROCEDURE — 82306 VITAMIN D 25 HYDROXY: CPT | Performed by: NURSE PRACTITIONER

## 2024-02-22 PROCEDURE — 87536 HIV-1 QUANT&REVRSE TRNSCRPJ: CPT | Performed by: NURSE PRACTITIONER

## 2024-02-22 PROCEDURE — 85027 COMPLETE CBC AUTOMATED: CPT | Performed by: NURSE PRACTITIONER

## 2024-02-22 PROCEDURE — 87491 CHLMYD TRACH DNA AMP PROBE: CPT | Performed by: NURSE PRACTITIONER

## 2024-02-22 PROCEDURE — 80061 LIPID PANEL: CPT | Performed by: NURSE PRACTITIONER

## 2024-02-22 PROCEDURE — 88185 FLOWCYTOMETRY/TC ADD-ON: CPT

## 2024-02-22 PROCEDURE — 81001 URINALYSIS AUTO W/SCOPE: CPT | Performed by: NURSE PRACTITIONER

## 2024-02-22 PROCEDURE — 36415 COLL VENOUS BLD VENIPUNCTURE: CPT | Performed by: NURSE PRACTITIONER

## 2024-02-22 PROCEDURE — 99215 OFFICE O/P EST HI 40 MIN: CPT | Mod: PBBFAC | Performed by: NURSE PRACTITIONER

## 2024-02-22 PROCEDURE — 86803 HEPATITIS C AB TEST: CPT | Performed by: NURSE PRACTITIONER

## 2024-02-22 PROCEDURE — 0219U NFCT AGT HIV GNRJ SEQ ALYS: CPT | Performed by: NURSE PRACTITIONER

## 2024-02-22 PROCEDURE — 99214 OFFICE O/P EST MOD 30 MIN: CPT | Mod: S$PBB,,, | Performed by: NURSE PRACTITIONER

## 2024-02-22 PROCEDURE — 80053 COMPREHEN METABOLIC PANEL: CPT | Performed by: NURSE PRACTITIONER

## 2024-02-22 PROCEDURE — 84443 ASSAY THYROID STIM HORMONE: CPT | Performed by: NURSE PRACTITIONER

## 2024-02-22 PROCEDURE — 86780 TREPONEMA PALLIDUM: CPT | Performed by: NURSE PRACTITIONER

## 2024-02-22 PROCEDURE — 88184 FLOWCYTOMETRY/ TC 1 MARKER: CPT | Performed by: NURSE PRACTITIONER

## 2024-02-22 PROCEDURE — 86592 SYPHILIS TEST NON-TREP QUAL: CPT | Performed by: NURSE PRACTITIONER

## 2024-02-22 RX ORDER — SULFAMETHOXAZOLE AND TRIMETHOPRIM 800; 160 MG/1; MG/1
1 TABLET ORAL DAILY
Qty: 30 TABLET | Refills: 2 | Status: SHIPPED | OUTPATIENT
Start: 2024-02-22 | End: 2024-05-13 | Stop reason: SDUPTHER

## 2024-02-22 RX ORDER — VIT C/E/ZN/COPPR/LUTEIN/ZEAXAN 250MG-90MG
1000 CAPSULE ORAL DAILY
Qty: 30 CAPSULE | Refills: 2 | Status: SHIPPED | OUTPATIENT
Start: 2024-02-22 | End: 2024-05-13

## 2024-02-22 RX ORDER — HYDROCORTISONE ACETATE 25 MG/1
25 SUPPOSITORY RECTAL NIGHTLY
Qty: 5 SUPPOSITORY | Refills: 0 | Status: SHIPPED | OUTPATIENT
Start: 2024-02-22 | End: 2024-02-27

## 2024-02-22 RX ORDER — BICTEGRAVIR SODIUM, EMTRICITABINE, AND TENOFOVIR ALAFENAMIDE FUMARATE 50; 200; 25 MG/1; MG/1; MG/1
1 TABLET ORAL DAILY
Qty: 30 TABLET | Refills: 2 | Status: SHIPPED | OUTPATIENT
Start: 2024-02-22 | End: 2024-05-13 | Stop reason: SDUPTHER

## 2024-02-22 RX ORDER — DOXYCYCLINE HYCLATE 100 MG
100 TABLET ORAL 2 TIMES DAILY
Qty: 56 TABLET | Refills: 0 | Status: SHIPPED | OUTPATIENT
Start: 2024-02-22 | End: 2024-03-21

## 2024-02-22 NOTE — TELEPHONE ENCOUNTER
----- Message from CHASITY Huff sent at 2/22/2024  2:38 PM CST -----  Reviewed labs. RPR  8 dils. Baseline titer 1:32 on 5/8/2022.  Pt will need to be retreated as he has not had a fourfold decrease.  He will need Doxycycline 100 mg 1 po BID X 28 days.

## 2024-02-22 NOTE — PROGRESS NOTES
Reviewed labs. RPR  8 dils. Baseline titer 1:32 on 5/8/2022.  Pt will need to be retreated as he has not had a fourfold decrease.  He will need Doxycycline 100 mg 1 po BID X 28 days.

## 2024-02-22 NOTE — PROGRESS NOTES
Patient ID: William Girard 33 y.o.     Chief Complaint:   Chief Complaint   Patient presents with    Followup Visit     States no meds x 9 months, + hemorrhoid pain with BM        HPI:    2/22/24  William Girard is a 34 y/o AAM here for AIDS f/u. Pt is currently incarcerated at [facility] and is accompanied by a guard.  Dx 5/8/22. [facility].  Pt reports non-adherence to Biktarvy x 9 months.  He states he attended his appointment 12/16/22 and then was incarcerated shortly after and has been unable to attend an appointment.  Pt denies fever, headache, chills, visual problems, N/V/D, SOB, cough, chest pain or abd pain. He complains of rectal pain that has been present >6 months.  Pt states he has a hemorrhoid that is very painful and when he has a bowel movement he sometimes notices some blood.  He is not currently taking anything for the hemorrhoid. Reviewed labs from 12/16/22 HIV VL [id number] and CD4 129.  Pt will need updated labs today. He has hx of syphilis in which he was treated with Bicillin LA 2.4 million units x 3  (11/21/22, 11/28/22, and 12/6/22). He is due for several vaccines, but will plan to hold at this time as CD4 count is low.  Pt is a former smoker.      12/16/22  William Girard is a 31 y/o AAM here for initial HIV visit.  Dx 5/8/22 while inpatient at [facility] for an anal abscess.  Pt was referred to [facility], but did not attend intake appt until 10/10/22. Then he missed several appts.  He presents today alone.  States he is having a hard time processing this diagnosis. Denies HI or SI. Tells me he has only told one friend that no one in his family is aware of his diagnosis.  Pt is bisexual but prefers men.  Anal receptive sex. >50 partners in his lifetime. No partner at present. Hx of drug use. Tells me he uses crystal meth and THC.  Smokes approx 1 pack of cigarettes per week.  Past hx of syphilis in which patient was treated with Bicillin LA 2.4 million units through the health unit  11/21/22, 11/28/22, and 12/6/22.  Pt also tells he that he has been treated for gonorrhea and trichomoniasis in the past. He denies fever, headache, chills, visual problems, N/V, SOB, cough, chest pain or abd pain. Complains of frequent diarrhea for the last 2 weeks. States every time he eats he has watery diarrhea. Pt also complains of some anal pain intermittently similar to the pain he was having with the anal abscess. He will need to be imaged to make sure this has resolved. Pretreatment labs done 10/10/22 HIV ,000 NE Rilpivirine and Doravarine, HR Sustiva with K103N and V106I mutation, CD4 268, QG neg, CMV IGG +, toxo IGG neg, crypto ag neg, syphilis AB +, RPR 1:8, Vit D 29.7, TSH 1.1325, Hep B surface ag neg, Hep B surface ab NR, Hep B core ab NR, UDS + THC and amphetamines. Pt will need a few updated labs today and will need stool studies as well. He is due for a COVID and flu vaccines today, but is not amenable. Pt is due for PCV20. He is amenable.         Past Medical History:   Diagnosis Date    HIV infection         History reviewed. No pertinent surgical history.     Social History     Socioeconomic History    Marital status: Single   Tobacco Use    Smoking status: Light Smoker     Current packs/day: 0.25     Average packs/day: 0.3 packs/day for 12.1 years (3.0 ttl pk-yrs)     Types: Cigarettes     Start date: 2012    Smokeless tobacco: Never   Substance and Sexual Activity    Alcohol use: Not Currently     Alcohol/week: 1.0 - 3.0 standard drink of alcohol     Types: 1 - 3 Drinks containing 0.5 oz of alcohol per week    Drug use: Yes     Types: Methamphetamines, Marijuana     Comment: clean since in [facility]    Sexual activity: Yes     Partners: Female, Male     Birth control/protection: Condom        Family History   Problem Relation Age of Onset    No Known Problems Mother     No Known Problems Father     No Known Problems Sister     No Known Problems Brother         Review of patient's allergies  "indicates:   Allergen Reactions    Ibuprofen Anaphylaxis        Immunization History   Administered Date(s) Administered    DTaP 1990, 01/24/1991, 03/14/1991, 02/12/1992, 09/22/1994    HIB 05/29/1991, 09/06/1991, 02/12/1992    HPV Quadrivalent 06/22/2010    Hepatitis B 08/10/2001, 09/14/2001, 12/04/2001    IPV 1990, 01/24/1991, 02/12/1992, 09/22/1994    MMR 02/12/1992, 09/22/1994    Meningococcal Conjugate (MCV4P) 04/14/2009    PPD Test 08/29/2001    Pneumococcal Conjugate - 20 Valent 12/16/2022    Tdap 04/14/2009        Review of Systems   Constitutional: Negative.    HENT: Negative.     Eyes: Negative.    Respiratory: Negative.     Cardiovascular: Negative.    Gastrointestinal: Negative.    Genitourinary: Negative.    Musculoskeletal: Negative.    Skin: Negative.    Neurological: Negative.    Endo/Heme/Allergies: Negative.    Psychiatric/Behavioral: Negative.     All other systems reviewed and are negative.         Objective:      /74 (BP Location: Left arm, Patient Position: Sitting, BP Method: Medium (Automatic))   Pulse 71   Temp 98.1 °F (36.7 °C) (Oral)   Resp 16   Ht 6' 2" (1.88 m)   Wt 96.8 kg (213 lb 4.8 oz)   BMI 27.39 kg/m²      Physical Exam  Vitals reviewed. Exam conducted with a chaperone present.   Constitutional:       General: He is not in acute distress.     Appearance: Normal appearance. He is obese.   HENT:      Head: Normocephalic.      Right Ear: External ear normal.      Left Ear: External ear normal.      Nose: Nose normal.      Mouth/Throat:      Mouth: Mucous membranes are moist.      Pharynx: Oropharynx is clear.   Eyes:      General: No scleral icterus.     Extraocular Movements: Extraocular movements intact.      Conjunctiva/sclera: Conjunctivae normal.      Pupils: Pupils are equal, round, and reactive to light.   Cardiovascular:      Rate and Rhythm: Normal rate and regular rhythm.      Pulses: Normal pulses.      Heart sounds: Normal heart sounds.   Pulmonary: "      Effort: Pulmonary effort is normal. No respiratory distress.      Breath sounds: Normal breath sounds.   Abdominal:      General: Bowel sounds are normal. There is no distension.      Palpations: Abdomen is soft. There is no mass.      Tenderness: There is no abdominal tenderness. There is no right CVA tenderness or left CVA tenderness.      Hernia: No hernia is present.   Genitourinary:     Prostate: Normal.      Rectum: Tenderness and external hemorrhoid present.      Comments: External hemorrhoid noted with tenderness to the anal canal  Musculoskeletal:         General: No tenderness or signs of injury. Normal range of motion.      Cervical back: Normal range of motion and neck supple.      Right lower leg: No edema.      Left lower leg: No edema.   Lymphadenopathy:      Cervical: No cervical adenopathy.   Skin:     General: Skin is warm and dry.      Capillary Refill: Capillary refill takes less than 2 seconds.      Findings: No erythema or lesion.   Neurological:      General: No focal deficit present.      Mental Status: He is alert and oriented to person, place, and time. Mental status is at baseline.   Psychiatric:         Mood and Affect: Mood normal.         Behavior: Behavior normal.         Thought Content: Thought content normal.         Judgment: Judgment normal.          Labs:   Lab Results   Component Value Date    WBC 7.5 12/16/2022    HGB 13.9 (L) 12/16/2022    HCT 43.7 12/16/2022    MCV 72.4 (L) 12/16/2022     12/16/2022       CMP  Sodium Level   Date Value Ref Range Status   12/16/2022 138 136 - 145 mmol/L Final     Potassium Level   Date Value Ref Range Status   12/16/2022 4.4 3.5 - 5.1 mmol/L Final     Carbon Dioxide   Date Value Ref Range Status   12/16/2022 23 22 - 29 mmol/L Final     Blood Urea Nitrogen   Date Value Ref Range Status   12/16/2022 10.5 8.9 - 20.6 mg/dL Final     Creatinine   Date Value Ref Range Status   12/16/2022 0.90 0.73 - 1.18 mg/dL Final     Calcium Level  Total   Date Value Ref Range Status   12/16/2022 9.0 8.4 - 10.2 mg/dL Final     Albumin Level   Date Value Ref Range Status   12/16/2022 3.6 3.5 - 5.0 g/dL Final     Bilirubin Total   Date Value Ref Range Status   12/16/2022 0.7 <=1.5 mg/dL Final     Alkaline Phosphatase   Date Value Ref Range Status   12/16/2022 49 40 - 150 unit/L Final     Aspartate Aminotransferase   Date Value Ref Range Status   12/16/2022 25 5 - 34 unit/L Final     Alanine Aminotransferase   Date Value Ref Range Status   12/16/2022 35 0 - 55 unit/L Final     eGFR   Date Value Ref Range Status   12/16/2022 >60 mls/min/1.73/m2 Final     Lab Results   Component Value Date    TSH 1.1325 10/10/2022     Hep C Ab Interp   Date Value Ref Range Status   10/10/2022 Nonreactive Nonreactive Final     Syphilis Antibody   Date Value Ref Range Status   12/16/2022 Reactive (A) Nonreactive, Equivocal Final     RPR   Date Value Ref Range Status   12/16/2022 Reactive (A) Non-Reactive Final     RPR Titer   Date Value Ref Range Status   12/16/2022 8 dils (A) (none) Final     Cholesterol Total   Date Value Ref Range Status   10/10/2022 199 <=200 mg/dL Final     HDL Cholesterol   Date Value Ref Range Status   10/10/2022 41 35 - 60 mg/dL Final     Triglyceride   Date Value Ref Range Status   10/10/2022 97 34 - 140 mg/dL Final     Cholesterol/HDL Ratio   Date Value Ref Range Status   10/10/2022 5 0 - 5 Final     Very Low Density Lipoprotein   Date Value Ref Range Status   10/10/2022 19  Final     LDL Cholesterol   Date Value Ref Range Status   10/10/2022 139.00 50.00 - 140.00 mg/dL Final     Vit D 25 OH   Date Value Ref Range Status   10/10/2022 29.7 (L) 30.0 - 80.0 ng/mL Final     Results for orders placed or performed in visit on 12/16/22   CD4 Lymphocytes   Result Value Ref Range    Patient Age 32     WBC Absolute 7,500 4,500 - 11,500 /mm3    Lymph Percent 21 (L) 28 - 48 %    Lymph Absolute 1,575 1,260 - 5,520 x10(3)/mcL    CD4 % 8.2 %    CD4 Absolute 129.15 (L) 589  - 1,505 unit/L    T Cell Interp       Normal absolute lymphocyte count with decreased absolute CD4+ lymphocyte count.    Stephen Pardo M.D.        Results for orders placed or performed in visit on 12/16/22   HIV-1 RNA, Quantitative, PCR with Reflex to Genotype   Result Value Ref Range    HIV-1 RNA Detect/Quant, P 222756 (A) Undetected copies/mL     Results for orders placed or performed in visit on 10/10/22   Quantiferon Gold TB   Result Value Ref Range    QuantiFERON-Tb Gold Plus Result Negative Negative    TB1 Ag minus Nil Result 0.00 IU/mL    TB2 Ag minus Nil Result 0.00 IU/mL    Mitogen minus Nil Result >10.00 IU/mL    Nil Result 0.02 IU/mL     Results for orders placed or performed in visit on 12/16/22   C.trach/N.gonor AMP RNA   Result Value Ref Range    Chlamydia trachomatis amplified RNA Negative Negative    Neisseria gonorrhoeae amplified RNA Negative Negative    Source oral     SOURCE: oral      Results for orders placed or performed in visit on 10/10/22   Urinalysis   Result Value Ref Range    Color, UA Light-Yellow Yellow, Light-Yellow, Dark Yellow, Tania, Straw    Appearance, UA Clear Clear    Specific Gravity, UA 1.017     pH, UA 6.0 5.0, 5.5, 6.0, 6.5, 7.0, 7.5, 8.0, 8.5    Protein, UA Negative Negative mg/dL    Glucose, UA Normal Negative, Normal mg/dL    Ketones, UA Negative Negative mg/dL    Blood, UA Negative Negative unit/L    Bilirubin, UA Negative Negative mg/dL    Urobilinogen, UA Normal 0.2, 1.0, Normal mg/dL    Nitrites, UA Negative Negative    Leukocyte Esterase, UA Negative Negative unit/L    WBC, UA 0-5 None Seen, 0-2, 3-5, 0-5 /HPF    Bacteria, UA None Seen None Seen /HPF    Squamous Epithelial Cells, UA None Seen None Seen /HPF    Hyaline Casts, UA None Seen None Seen /lpf    RBC, UA 0-5 None Seen, 0-2, 3-5, 0-5 /HPF       Imaging: Reviewed most recent relevant imaging studies available, notable results highlighted in this note    Medications:     Current Outpatient Medications    Medication Instructions    jyrzclnmc-blwphvkx-enwuyxu ala (BIKTARVY) -25 mg (25 kg or greater) 1 tablet, Oral, Daily    cholecalciferol (vitamin D3) (VITAMIN D3) 1,000 Units, Oral, Daily    sulfamethoxazole-trimethoprim 800-160mg (BACTRIM DS) 800-160 mg Tab 1 tablet, Oral, Daily       Assessment:       Problem List Items Addressed This Visit    None  Visit Diagnoses       AIDS (acquired immunodeficiency syndrome), CD4 <=200/<=14%    -  Primary    Relevant Orders    Lipid Panel    Quantiferon Gold TB    SYPHILIS ANTIBODY (WITH REFLEX RPR)    TSH    Urinalysis    Hepatitis C Antibody    Comprehensive Metabolic Panel    CBC Auto Differential    CD4 Lymphocytes    HIV-1 RNA, Quantitative, PCR with Reflex to Genotype    Ambulatory referral/consult to General Surgery    Vitamin D deficiency        Relevant Medications    cholecalciferol, vitamin D3, (VITAMIN D3) 25 mcg (1,000 unit) capsule    Other Relevant Orders    Vitamin D    Routine screening for STI (sexually transmitted infection)        Relevant Orders    SYPHILIS ANTIBODY (WITH REFLEX RPR)    Chlamydia/GC, PCR    External hemorrhoid        Relevant Orders    Ambulatory referral/consult to General Surgery    HIV disease        Relevant Medications    vqvgkbuju-fndbnvzz-rccophz ala (BIKTARVY) -25 mg (25 kg or greater)    AIDS (acquired immunodeficiency syndrome), CD4 <=200        Relevant Medications    sulfamethoxazole-trimethoprim 800-160mg (BACTRIM DS) 800-160 mg Tab               Plan:      AIDS (acquired immunodeficiency syndrome), CD4 <=200/<=14%  -     Lipid Panel; Future; Expected date: 02/22/2024  -     Quantiferon Gold TB; Future; Expected date: 02/22/2024  -     SYPHILIS ANTIBODY (WITH REFLEX RPR); Future; Expected date: 02/22/2024  -     TSH; Future; Expected date: 02/22/2024  -     Urinalysis; Future; Expected date: 02/22/2024  -     Hepatitis C Antibody; Future; Expected date: 02/22/2024  -     Comprehensive Metabolic Panel; Future;  Expected date: 02/22/2024  -     CBC Auto Differential; Future; Expected date: 02/22/2024  -     CD4 Lymphocytes; Future; Expected date: 02/22/2024  -     HIV-1 RNA, Quantitative, PCR with Reflex to Genotype; Future; Expected date: 02/22/2024  -     Ambulatory referral/consult to General Surgery; Future; Expected date: 02/29/2024  -     eqkszxkeo-doziezgs-vdkyvbb ala (BIKTARVY) -25 mg (25 kg or greater); Take 1 tablet by mouth once daily.  Dispense: 30 tablet; Refill: 2  -     sulfamethoxazole-trimethoprim 800-160mg (BACTRIM DS) 800-160 mg Tab; Take 1 tablet by mouth once daily.  Dispense: 30 tablet; Refill: 2  Extensive education provided regarding adherence, sexual health, medication management, attending scheduled visits, risks and benefits of medication.  Use condoms for all sexual encounters.  Consider complete abstinence until virally suppressed.  Notify sexual partner(s) of disease status.   Uncontrolled HIV can cause not only a weakened immune system & leave one susceptible to opportunistic infections, but can also lead to renal, cardiac, neurological, cardiovascular, and hepatic dysfunction as a result of chronic inflammation.  Take all medications as prescribed and keep follow-up with providers as scheduled.   Incorrect use of HIV medications can lead to developing resistance and loss of control of virus.  This can also limit future treatment options.   Notify our office for any concerns that may emerge, particularly if you are having trouble with obtaining medication or changes in insurance status so that we may assist you.           Restart Biktarvy 1 po q day and Bactrim DS 1 po q day as directed   Labs today   RTC 6 weeks with Julia for a follow up  Refer to eye clinic and surgery clinic   COVID and flu precautions discussed.   Plan to hold vaccines until CD4 improves    Vitamin D deficiency  -     Vitamin D; Future; Expected date: 02/22/2024  -     cholecalciferol, vitamin D3, (VITAMIN D3) 25  mcg (1,000 unit) capsule; Take 1 capsule (1,000 Units total) by mouth once daily.  Dispense: 30 capsule; Refill: 2  Restart Vit D 1000 units once daily.  Pt educated on the importance of Vit D to bone and organ health.     Routine screening for STI (sexually transmitted infection)  -     SYPHILIS ANTIBODY (WITH REFLEX RPR); Future; Expected date: 02/22/2024  -     Chlamydia/GC, PCR; Future; Expected date: 02/22/2024    External hemorrhoid  -     Ambulatory referral/consult to General Surgery; Future; Expected date: 02/29/2024  Sitz bath recommended. However, patient does not have access to this.  Hydrocortisone supp rx x 5 days.  Keep area clean and dry.   No straining.  Do not sit on the toilet for extending periods of time.         36 minutes of total time spent on the encounter, which includes face to face time and non-face to face time preparing to see the patient (eg, review of tests), Obtaining and/or reviewing separately obtained history, Documenting clinical information in the electronic or other health record, Independently interpreting results (not separately reported) and communicating results to the patient/family/caregiver, or Care coordination (not separately reported).

## 2024-02-24 LAB
HIV1 RNA # PLAS NAA DL=20: ABNORMAL COPIES/ML
MAYO GENERIC ORDERABLE RESULT: NORMAL

## 2024-02-25 LAB
GAMMA INTERFERON BACKGROUND BLD IA-ACNC: 0.03 IU/ML
M TB IFN-G BLD-IMP: NEGATIVE
M TB IFN-G CD4+ BCKGRND COR BLD-ACNC: 0 IU/ML
M TB IFN-G CD4+CD8+ BCKGRND COR BLD-ACNC: 0 IU/ML
MITOGEN IGNF BCKGRD COR BLD-ACNC: 4.94 IU/ML

## 2024-03-12 DIAGNOSIS — K64.9 HEMORRHOIDS: Primary | ICD-10-CM

## 2024-05-13 ENCOUNTER — TELEPHONE (OUTPATIENT)
Dept: INFECTIOUS DISEASES | Facility: CLINIC | Age: 34
End: 2024-05-13

## 2024-05-13 ENCOUNTER — LAB VISIT (OUTPATIENT)
Dept: LAB | Facility: HOSPITAL | Age: 34
End: 2024-05-13
Attending: NURSE PRACTITIONER

## 2024-05-13 ENCOUNTER — OFFICE VISIT (OUTPATIENT)
Dept: INFECTIOUS DISEASES | Facility: CLINIC | Age: 34
End: 2024-05-13
Payer: COMMERCIAL

## 2024-05-13 VITALS
TEMPERATURE: 98 F | DIASTOLIC BLOOD PRESSURE: 65 MMHG | BODY MASS INDEX: 27.72 KG/M2 | HEIGHT: 74 IN | RESPIRATION RATE: 14 BRPM | WEIGHT: 216 LBS | SYSTOLIC BLOOD PRESSURE: 102 MMHG | HEART RATE: 64 BPM

## 2024-05-13 DIAGNOSIS — R74.8 ELEVATED LIVER ENZYMES: Primary | ICD-10-CM

## 2024-05-13 DIAGNOSIS — A53.9 SYPHILIS: ICD-10-CM

## 2024-05-13 DIAGNOSIS — B20 HIV DISEASE: Primary | ICD-10-CM

## 2024-05-13 DIAGNOSIS — K02.9 DENTAL CARIES: ICD-10-CM

## 2024-05-13 DIAGNOSIS — B20 HIV DISEASE: ICD-10-CM

## 2024-05-13 DIAGNOSIS — Z23 IMMUNIZATION DUE: ICD-10-CM

## 2024-05-13 DIAGNOSIS — E55.9 VITAMIN D DEFICIENCY: ICD-10-CM

## 2024-05-13 LAB
ALBUMIN SERPL-MCNC: 4 G/DL (ref 3.5–5)
ALBUMIN/GLOB SERPL: 0.8 RATIO (ref 1.1–2)
ALP SERPL-CCNC: 47 UNIT/L (ref 40–150)
ALT SERPL-CCNC: 64 UNIT/L (ref 0–55)
AST SERPL-CCNC: 173 UNIT/L (ref 5–34)
BASOPHILS # BLD AUTO: 0.02 X10(3)/MCL
BASOPHILS NFR BLD AUTO: 0.4 %
BILIRUB SERPL-MCNC: 1 MG/DL
BUN SERPL-MCNC: 11.3 MG/DL (ref 8.9–20.6)
CALCIUM SERPL-MCNC: 9.5 MG/DL (ref 8.4–10.2)
CHLORIDE SERPL-SCNC: 105 MMOL/L (ref 98–107)
CO2 SERPL-SCNC: 25 MMOL/L (ref 22–29)
CREAT SERPL-MCNC: 1.12 MG/DL (ref 0.73–1.18)
EOSINOPHIL # BLD AUTO: 0.07 X10(3)/MCL (ref 0–0.9)
EOSINOPHIL NFR BLD AUTO: 1.5 %
ERYTHROCYTE [DISTWIDTH] IN BLOOD BY AUTOMATED COUNT: 18 % (ref 11.5–17)
GFR SERPLBLD CREATININE-BSD FMLA CKD-EPI: >60 ML/MIN/1.73/M2
GLOBULIN SER-MCNC: 4.8 GM/DL (ref 2.4–3.5)
GLUCOSE SERPL-MCNC: 80 MG/DL (ref 74–100)
HCT VFR BLD AUTO: 43.2 % (ref 42–52)
HGB BLD-MCNC: 14.1 G/DL (ref 14–18)
IMM GRANULOCYTES # BLD AUTO: 0 X10(3)/MCL (ref 0–0.04)
IMM GRANULOCYTES NFR BLD AUTO: 0 %
LYMPHOCYTES # BLD AUTO: 2.05 X10(3)/MCL (ref 0.6–4.6)
LYMPHOCYTES NFR BLD AUTO: 43.3 %
MCH RBC QN AUTO: 23.7 PG (ref 27–31)
MCHC RBC AUTO-ENTMCNC: 32.6 G/DL (ref 33–36)
MCV RBC AUTO: 72.5 FL (ref 80–94)
MONOCYTES # BLD AUTO: 0.47 X10(3)/MCL (ref 0.1–1.3)
MONOCYTES NFR BLD AUTO: 9.9 %
NEUTROPHILS # BLD AUTO: 2.12 X10(3)/MCL (ref 2.1–9.2)
NEUTROPHILS NFR BLD AUTO: 44.9 %
NRBC BLD AUTO-RTO: 0 %
PLATELET # BLD AUTO: 251 X10(3)/MCL (ref 130–400)
PMV BLD AUTO: 9.5 FL (ref 7.4–10.4)
POTASSIUM SERPL-SCNC: 4 MMOL/L (ref 3.5–5.1)
PROT SERPL-MCNC: 8.8 GM/DL (ref 6.4–8.3)
RBC # BLD AUTO: 5.96 X10(6)/MCL (ref 4.7–6.1)
RPR SER QL: REACTIVE
RPR SER-TITR: ABNORMAL {TITER}
SODIUM SERPL-SCNC: 135 MMOL/L (ref 136–145)
T PALLIDUM AB SER QL: REACTIVE
WBC # SPEC AUTO: 4.73 X10(3)/MCL (ref 4.5–11.5)

## 2024-05-13 PROCEDURE — 85025 COMPLETE CBC W/AUTO DIFF WBC: CPT

## 2024-05-13 PROCEDURE — 99214 OFFICE O/P EST MOD 30 MIN: CPT | Mod: PBBFAC,25 | Performed by: NURSE PRACTITIONER

## 2024-05-13 PROCEDURE — 99214 OFFICE O/P EST MOD 30 MIN: CPT | Mod: S$PBB,,, | Performed by: NURSE PRACTITIONER

## 2024-05-13 PROCEDURE — 80053 COMPREHEN METABOLIC PANEL: CPT

## 2024-05-13 PROCEDURE — 87536 HIV-1 QUANT&REVRSE TRNSCRPJ: CPT

## 2024-05-13 PROCEDURE — 86592 SYPHILIS TEST NON-TREP QUAL: CPT

## 2024-05-13 PROCEDURE — 90471 IMMUNIZATION ADMIN: CPT | Mod: PBBFAC

## 2024-05-13 PROCEDURE — 86780 TREPONEMA PALLIDUM: CPT

## 2024-05-13 PROCEDURE — 90715 TDAP VACCINE 7 YRS/> IM: CPT | Mod: PBBFAC

## 2024-05-13 PROCEDURE — 36415 COLL VENOUS BLD VENIPUNCTURE: CPT

## 2024-05-13 PROCEDURE — 86360 T CELL ABSOLUTE COUNT/RATIO: CPT

## 2024-05-13 RX ORDER — ERGOCALCIFEROL 1.25 MG/1
50000 CAPSULE ORAL
Qty: 12 CAPSULE | Refills: 1 | Status: SHIPPED | OUTPATIENT
Start: 2024-05-13 | End: 2024-06-13 | Stop reason: SDUPTHER

## 2024-05-13 RX ORDER — BICTEGRAVIR SODIUM, EMTRICITABINE, AND TENOFOVIR ALAFENAMIDE FUMARATE 50; 200; 25 MG/1; MG/1; MG/1
1 TABLET ORAL DAILY
Qty: 30 TABLET | Refills: 3 | Status: SHIPPED | OUTPATIENT
Start: 2024-05-13 | End: 2024-06-13 | Stop reason: SDUPTHER

## 2024-05-13 RX ORDER — SULFAMETHOXAZOLE AND TRIMETHOPRIM 800; 160 MG/1; MG/1
1 TABLET ORAL DAILY
Qty: 30 TABLET | Refills: 3 | Status: SHIPPED | OUTPATIENT
Start: 2024-05-13 | End: 2024-06-13 | Stop reason: SDUPTHER

## 2024-05-13 RX ADMIN — TETANUS TOXOID, REDUCED DIPHTHERIA TOXOID AND ACELLULAR PERTUSSIS VACCINE, ADSORBED 0.5 ML: 5; 2.5; 8; 8; 2.5 SUSPENSION INTRAMUSCULAR at 08:05

## 2024-05-13 NOTE — TELEPHONE ENCOUNTER
Called [facility] and informed Nazia of results and providers recommendations. She verbalized understanding.  Also informed Christina (JOSE MANUEL) of results and providers recommendations.   Will fax this note  and lab results to both.

## 2024-05-13 NOTE — TELEPHONE ENCOUNTER
----- Message from CHASITY Huff sent at 5/13/2024  2:05 PM CDT -----  Reviewed labs.  Liver enzymes are elevated.  and ALT 64. Pt will need a hepatitis panel and liver US.   Please contact [facility] regarding this.  I will place the order.

## 2024-05-13 NOTE — PROGRESS NOTES
Patient ID: William Girard 33 y.o.     Chief Complaint:   Chief Complaint   Patient presents with    Followup Visit     B20, Denies problems        HPI:    5/13/24  William Girard is a 32 y/o here for advanced HIV follow up.  Pt is currently incarcerated at [facility] and is accompanied by a guard.  Dx 5/8/22. [facility]. He reports adherence to Biktarvy and Bactrim DS since restarting after our last visit. Tells me he is not taking a Vit D supplement.  Pt denies fever, headache, chills, visual problems, N/V/D, SOB, cough, chest pain or abd pain. Reviewed labs from 2/22/24 HIV VL 3.14 million, K103N, V106I, RPR 8 dils, Vit D 9.9. 12/16/22 CD4 129.  Hx of syphilis in which he was treated with Bicillin LA 2.4 million units x 3  (11/21/22, 11/28/22, and 12/6/22). Pt is due for a Tdap vaccine and is amenable. Eye appt scheduled for 6/10/24.     2/22/24  William Girard is a 32 y/o AAM here for AIDS f/u. Pt is currently incarcerated at [facility] and is accompanied by a guard.  Dx 5/8/22. [facility].  Pt reports non-adherence to Biktarvy x 9 months.  He states he attended his appointment 12/16/22 and then was incarcerated shortly after and has been unable to attend an appointment.  Pt denies fever, headache, chills, visual problems, N/V/D, SOB, cough, chest pain or abd pain. He complains of rectal pain that has been present >6 months.  Pt states he has a hemorrhoid that is very painful and when he has a bowel movement he sometimes notices some blood.  He is not currently taking anything for the hemorrhoid. Reviewed labs from 12/16/22 HIV VL [address] and CD4 129.  Pt will need updated labs today. He has hx of syphilis in which he was treated with Bicillin LA 2.4 million units x 3  (11/21/22, 11/28/22, and 12/6/22). He is due for several vaccines, but will plan to hold at this time as CD4 count is low.  Pt is a former smoker.       12/16/22  William Girard is a 33 y/o AAM here for initial HIV  visit.  Dx 5/8/22 while inpatient at [facility] for an anal abscess.  Pt was referred to [facility], but did not attend intake appt until 10/10/22. Then he missed several appts.  He presents today alone.  States he is having a hard time processing this diagnosis. Denies HI or SI. Tells me he has only told one friend that no one in his family is aware of his diagnosis.  Pt is bisexual but prefers men.  Anal receptive sex. >50 partners in his lifetime. No partner at present. Hx of drug use. Tells me he uses crystal meth and THC.  Smokes approx 1 pack of cigarettes per week.  Past hx of syphilis in which patient was treated with Bicillin LA 2.4 million units through the health unit 11/21/22, 11/28/22, and 12/6/22.  Pt also tells he that he has been treated for gonorrhea and trichomoniasis in the past. He denies fever, headache, chills, visual problems, N/V, SOB, cough, chest pain or abd pain. Complains of frequent diarrhea for the last 2 weeks. States every time he eats he has watery diarrhea. Pt also complains of some anal pain intermittently similar to the pain he was having with the anal abscess. He will need to be imaged to make sure this has resolved. Pretreatment labs done 10/10/22 HIV ,000 IL Rilpivirine and Doravarine, HR Sustiva with K103N and V106I mutation, CD4 268, QG neg, CMV IGG +, toxo IGG neg, crypto ag neg, syphilis AB +, RPR 1:8, Vit D 29.7, TSH 1.1325, Hep B surface ag neg, Hep B surface ab NR, Hep B core ab NR, UDS + THC and amphetamines. Pt will need a few updated labs today and will need stool studies as well. He is due for a COVID and flu vaccines today, but is not amenable. Pt is due for PCV20. He is amenable.           Past Medical History:   Diagnosis Date    HIV infection         History reviewed. No pertinent surgical history.     Social History     Socioeconomic History    Marital status: Single   Tobacco Use    Smoking status: Light Smoker     Current packs/day: 0.25     Average packs/day: 0.3  "packs/day for 12.4 years (3.1 ttl pk-yrs)     Types: Cigarettes     Start date: 2012    Smokeless tobacco: Never   Substance and Sexual Activity    Alcohol use: Not Currently     Alcohol/week: 1.0 - 3.0 standard drink of alcohol     Types: 1 - 3 Drinks containing 0.5 oz of alcohol per week    Drug use: Yes     Types: Methamphetamines, Marijuana     Comment: clean since in retirement    Sexual activity: Yes     Partners: Female, Male     Birth control/protection: Condom        Family History   Problem Relation Name Age of Onset    No Known Problems Mother      No Known Problems Father      No Known Problems Sister      No Known Problems Brother          Review of patient's allergies indicates:   Allergen Reactions    Ibuprofen Anaphylaxis        Immunization History   Administered Date(s) Administered    DTaP 1990, 01/24/1991, 03/14/1991, 02/12/1992, 09/22/1994    HIB 05/29/1991, 09/06/1991, 02/12/1992    HPV Quadrivalent 06/22/2010    Hepatitis B 08/10/2001, 09/14/2001, 12/04/2001    IPV 1990, 01/24/1991, 02/12/1992, 09/22/1994    MMR 02/12/1992, 09/22/1994    Meningococcal Conjugate (MCV4P) 04/14/2009    PPD Test 08/29/2001    Pneumococcal Conjugate - 20 Valent 12/16/2022    Tdap 04/14/2009, 05/13/2024        Review of Systems   Constitutional: Negative.    HENT: Negative.     Eyes: Negative.    Respiratory: Negative.     Cardiovascular: Negative.    Gastrointestinal: Negative.    Genitourinary: Negative.    Musculoskeletal: Negative.    Skin: Negative.    Neurological: Negative.    Endo/Heme/Allergies: Negative.    Psychiatric/Behavioral: Negative.     All other systems reviewed and are negative.         Objective:      /65 (BP Location: Left arm, Patient Position: Sitting, BP Method: Large (Automatic))   Pulse 64   Temp 98 °F (36.7 °C) (Oral)   Resp 14   Ht 6' 2" (1.88 m)   Wt 98 kg (216 lb)   BMI 27.73 kg/m²      Physical Exam  Vitals reviewed.   Constitutional:       General: He is not in " acute distress.     Appearance: Normal appearance. He is obese.   HENT:      Head: Normocephalic.      Right Ear: External ear normal.      Left Ear: External ear normal.      Nose: Nose normal.      Mouth/Throat:      Mouth: Mucous membranes are moist.      Pharynx: Oropharynx is clear.      Comments: Broken lower tooth noted   Eyes:      General: No scleral icterus.     Extraocular Movements: Extraocular movements intact.      Conjunctiva/sclera: Conjunctivae normal.      Pupils: Pupils are equal, round, and reactive to light.   Cardiovascular:      Rate and Rhythm: Normal rate and regular rhythm.      Pulses: Normal pulses.      Heart sounds: Normal heart sounds.   Pulmonary:      Effort: Pulmonary effort is normal. No respiratory distress.      Breath sounds: Normal breath sounds.   Abdominal:      General: Bowel sounds are normal. There is no distension.      Palpations: Abdomen is soft. There is no mass.      Tenderness: There is no abdominal tenderness. There is no right CVA tenderness or left CVA tenderness.      Hernia: No hernia is present.   Musculoskeletal:         General: No tenderness or signs of injury. Normal range of motion.      Cervical back: Normal range of motion and neck supple.      Right lower leg: No edema.      Left lower leg: No edema.   Lymphadenopathy:      Cervical: No cervical adenopathy.   Skin:     General: Skin is warm and dry.      Capillary Refill: Capillary refill takes less than 2 seconds.      Findings: No erythema or lesion.   Neurological:      General: No focal deficit present.      Mental Status: He is alert and oriented to person, place, and time. Mental status is at baseline.   Psychiatric:         Mood and Affect: Mood normal.         Behavior: Behavior normal.         Thought Content: Thought content normal.         Judgment: Judgment normal.          Labs:   Lab Results   Component Value Date    WBC 4.73 05/13/2024    HGB 14.1 05/13/2024    HCT 43.2 05/13/2024    MCV  72.5 (L) 05/13/2024     05/13/2024       CMP  Sodium   Date Value Ref Range Status   02/22/2024 144 136 - 145 mmol/L Final     Potassium   Date Value Ref Range Status   02/22/2024 3.6 3.5 - 5.1 mmol/L Final     CO2   Date Value Ref Range Status   02/22/2024 30 (H) 22 - 29 mmol/L Final     Blood Urea Nitrogen   Date Value Ref Range Status   02/22/2024 9.0 8.9 - 20.6 mg/dL Final     Creatinine   Date Value Ref Range Status   02/22/2024 0.83 0.73 - 1.18 mg/dL Final     Calcium   Date Value Ref Range Status   02/22/2024 8.4 8.4 - 10.2 mg/dL Final     Albumin   Date Value Ref Range Status   02/22/2024 3.3 (L) 3.5 - 5.0 g/dL Final     Bilirubin Total   Date Value Ref Range Status   02/22/2024 1.0 <=1.5 mg/dL Final     ALP   Date Value Ref Range Status   02/22/2024 52 40 - 150 unit/L Final     AST   Date Value Ref Range Status   02/22/2024 31 5 - 34 unit/L Final     ALT   Date Value Ref Range Status   02/22/2024 27 0 - 55 unit/L Final     eGFR   Date Value Ref Range Status   02/22/2024 >60 mls/min/1.73/m2 Final     Lab Results   Component Value Date    TSH 0.629 02/22/2024     Hep C Ab Interp   Date Value Ref Range Status   02/22/2024 Nonreactive Nonreactive Final     Syphilis Antibody   Date Value Ref Range Status   02/22/2024 Reactive (A) Nonreactive, Equivocal Final     RPR   Date Value Ref Range Status   02/22/2024 Reactive (A) Non-Reactive Final     RPR Titer   Date Value Ref Range Status   02/22/2024 8 dils (A) (none) Final     Cholesterol Total   Date Value Ref Range Status   02/22/2024 164 <=200 mg/dL Final     HDL Cholesterol   Date Value Ref Range Status   02/22/2024 41 35 - 60 mg/dL Final     Triglyceride   Date Value Ref Range Status   02/22/2024 100 34 - 140 mg/dL Final     Cholesterol/HDL Ratio   Date Value Ref Range Status   02/22/2024 4 0 - 5 Final     Very Low Density Lipoprotein   Date Value Ref Range Status   02/22/2024 20  Final     LDL Cholesterol   Date Value Ref Range Status   02/22/2024  103.00 50.00 - 140.00 mg/dL Final     Vitamin D   Date Value Ref Range Status   02/22/2024 9.9 (L) 30.0 - 80.0 ng/mL Final     Results for orders placed or performed in visit on 12/16/22   CD4 Lymphocytes   Result Value Ref Range    Patient Age 32     WBC Absolute 7,500 4,500 - 11,500 /mm3    Lymph Percent 21 (L) 28 - 48 %    Lymph Absolute 1,575 1,260 - 5,520 x10(3)/mcL    CD4 % 8.2 %    CD4 Absolute 129.15 (L) 589 - 1,505 unit/L    T Cell Interp       Normal absolute lymphocyte count with decreased absolute CD4+ lymphocyte count.    Stephen Pardo M.D.        Results for orders placed or performed in visit on 02/22/24   HIV-1 RNA, Quantitative, PCR with Reflex to Genotype   Result Value Ref Range    HIV-1 RNA Detect/Quant, P [id number] (A) Undetected copies/mL     Results for orders placed or performed in visit on 02/22/24   Quantiferon Gold TB   Result Value Ref Range    QuantiFERON-Tb Gold Plus Result Negative Negative    TB1 Ag minus Nil Result 0.00 IU/mL    TB2 Ag minus Nil Result 0.00 IU/mL    Mitogen minus Nil Result 4.94 IU/mL    Nil Result 0.03 IU/mL     Results for orders placed or performed in visit on 12/16/22   C.trach/N.gonor AMP RNA    Specimen: Throat   Result Value Ref Range    Chlamydia trachomatis amplified RNA Negative Negative    Neisseria gonorrhoeae amplified RNA Negative Negative    Source oral     SOURCE: oral      Results for orders placed or performed in visit on 02/22/24   Urinalysis   Result Value Ref Range    Color, UA Yellow Yellow, Light-Yellow, Dark Yellow, Tania, Straw    Appearance, UA Clear Clear    Specific Gravity, UA 1.017 1.005 - 1.030    pH, UA 6.0 5.0 - 8.5    Protein, UA Negative Negative    Glucose, UA Normal Negative, Normal    Ketones, UA Negative Negative    Blood, UA Negative Negative    Bilirubin, UA Negative Negative    Urobilinogen, UA 1+ (A) 0.2, 1.0, Normal    Nitrites, UA Negative Negative    Leukocyte Esterase, UA Negative Negative    WBC, UA 0-5 None Seen,  0-2, 3-5, 0-5 /HPF    Bacteria, UA None Seen None Seen /HPF    Squamous Epithelial Cells, UA None Seen None Seen /HPF    Hyaline Casts, UA None Seen None Seen /lpf    RBC, UA 0-5 None Seen, 0-2, 3-5, 0-5 /HPF       Imaging: Reviewed most recent relevant imaging studies available, notable results highlighted in this note    Medications:     Current Outpatient Medications   Medication Instructions    mvrancdtj-issaxbrx-xkifglp ala (BIKTARVY) -25 mg (25 kg or greater) 1 tablet, Oral, Daily    ergocalciferol (VITAMIN D2) 50,000 Units, Oral, Every 7 days    sulfamethoxazole-trimethoprim 800-160mg (BACTRIM DS) 800-160 mg Tab 1 tablet, Oral, Daily       Assessment:       Problem List Items Addressed This Visit          ID    Syphilis    Relevant Orders    SYPHILIS ANTIBODY (WITH REFLEX RPR)     Other Visit Diagnoses       HIV disease    -  Primary    Relevant Medications    vzebogtki-nvalnbhr-bnykwfa ala (BIKTARVY) -25 mg (25 kg or greater)    sulfamethoxazole-trimethoprim 800-160mg (BACTRIM DS) 800-160 mg Tab    Other Relevant Orders    HIV-1 RNA, Quantitative, PCR with Reflex to Genotype    CBC Auto Differential (Completed)    CD4 T-Cranfills Gap Cells    Comprehensive Metabolic Panel    Vitamin D deficiency        Immunization due        Relevant Medications    Tdap (BOOSTRIX) vaccine injection 0.5 mL (Completed)    Dental caries                   Plan:      HIV disease  -     HIV-1 RNA, Quantitative, PCR with Reflex to Genotype; Future; Expected date: 05/13/2024  -     CBC Auto Differential; Future; Expected date: 05/13/2024  -     CD4 T-Cranfills Gap Cells; Future; Expected date: 05/13/2024  -     Comprehensive Metabolic Panel; Future; Expected date: 05/13/2024  -     mxeafkkje-vsdoegie-rejqrmo ala (BIKTARVY) -25 mg (25 kg or greater); Take 1 tablet by mouth once daily.  Dispense: 30 tablet; Refill: 3  -     sulfamethoxazole-trimethoprim 800-160mg (BACTRIM DS) 800-160 mg Tab; Take 1 tablet by mouth once daily.   Dispense: 30 tablet; Refill: 3  Adherence and sexual health counseling done.  Use condoms for all sexual encounters.  Blood precautions.   Continue Biktarvy 1 po q day and Bactrim DS 1 po q day as prescribed.  Labs today  RTC 3 months with Julia     Vitamin D deficiency  -     ergocalciferol (VITAMIN D2) 50,000 unit Cap; Take 1 capsule (50,000 Units total) by mouth every 7 days.  Dispense: 12 capsule; Refill: 1  Continue meds. Pt educated on the importance of Vit D to bone and organ health.     Syphilis  -     SYPHILIS ANTIBODY (WITH REFLEX RPR); Future; Expected date: 05/13/2024  Hx of syphilis in which he was treated with Bicillin LA 2.4 million units x 3  (11/21/22, 11/28/22, and 12/6/22).   Repeat RPR     Immunization due  -     Tdap (BOOSTRIX) vaccine injection 0.5 mL    Dental caries  Pt needs to follow up with the dentist at [facility] facility

## 2024-05-13 NOTE — PROGRESS NOTES
Reviewed labs.  Liver enzymes are elevated.  and ALT 64. Pt will need a hepatitis panel and liver US.   Please contact [facility] regarding this.  I will place the order.

## 2024-05-14 LAB
CD3 CELLS # BLD: 1483 CELLS/MCL (ref 550–2202)
CD3 CELLS NFR BLD: 78 % (ref 58–86)
CD3+CD4+ CELLS # BLD: 166 CELLS/MCL (ref 365–1437)
CD3+CD4+ CELLS NFR BLD: 9 % (ref 32–64)
CD3+CD4+ CELLS/CD3+CD8+ CLL BLD: 0.1 %
CD3+CD8+ CELLS # BLD: 1172 CELLS/MCL (ref 171–846)
CD3+CD8+ CELLS NFR BLD: 62 % (ref 15–40)
CD45 CELLS # BLD: 1.9 THOU/MCL (ref 0.82–2.84)
HIV1 RNA # PLAS NAA DL=20: 267 COPIES/ML

## 2024-05-28 ENCOUNTER — HOSPITAL ENCOUNTER (OUTPATIENT)
Dept: RADIOLOGY | Facility: HOSPITAL | Age: 34
Discharge: HOME | End: 2024-05-28
Attending: NURSE PRACTITIONER
Payer: COMMERCIAL

## 2024-05-28 DIAGNOSIS — R74.8 ELEVATED LIVER ENZYMES: ICD-10-CM

## 2024-05-28 PROCEDURE — 76705 ECHO EXAM OF ABDOMEN: CPT | Mod: TC

## 2024-06-13 ENCOUNTER — TELEPHONE (OUTPATIENT)
Dept: INFECTIOUS DISEASES | Facility: CLINIC | Age: 34
End: 2024-06-13
Payer: COMMERCIAL

## 2024-06-13 DIAGNOSIS — B20 HIV DISEASE: ICD-10-CM

## 2024-06-13 RX ORDER — BICTEGRAVIR SODIUM, EMTRICITABINE, AND TENOFOVIR ALAFENAMIDE FUMARATE 50; 200; 25 MG/1; MG/1; MG/1
1 TABLET ORAL DAILY
Qty: 30 TABLET | Refills: 3 | Status: SHIPPED | OUTPATIENT
Start: 2024-06-13

## 2024-06-13 RX ORDER — ERGOCALCIFEROL 1.25 MG/1
50000 CAPSULE ORAL
Qty: 12 CAPSULE | Refills: 1 | Status: SHIPPED | OUTPATIENT
Start: 2024-06-13

## 2024-06-13 RX ORDER — SULFAMETHOXAZOLE AND TRIMETHOPRIM 800; 160 MG/1; MG/1
1 TABLET ORAL DAILY
Qty: 30 TABLET | Refills: 3 | Status: SHIPPED | OUTPATIENT
Start: 2024-06-13

## 2024-06-13 NOTE — TELEPHONE ENCOUNTER
Received a message from Christina requesting a printed out rx for pt's medication, a copy was printed at pt's visit  and given to MACARIO for Christina unsure of what happened. Can you please print out rx and I will scan into chart prior to sending it out.

## 2024-08-13 ENCOUNTER — OFFICE VISIT (OUTPATIENT)
Dept: INFECTIOUS DISEASES | Facility: CLINIC | Age: 34
End: 2024-08-13
Payer: COMMERCIAL

## 2024-08-13 ENCOUNTER — LAB VISIT (OUTPATIENT)
Dept: LAB | Facility: HOSPITAL | Age: 34
End: 2024-08-13
Attending: NURSE PRACTITIONER
Payer: COMMERCIAL

## 2024-08-13 VITALS
SYSTOLIC BLOOD PRESSURE: 115 MMHG | WEIGHT: 231.5 LBS | RESPIRATION RATE: 12 BRPM | BODY MASS INDEX: 29.71 KG/M2 | HEIGHT: 74 IN | HEART RATE: 62 BPM | DIASTOLIC BLOOD PRESSURE: 66 MMHG | TEMPERATURE: 98 F

## 2024-08-13 DIAGNOSIS — K02.9 DENTAL CARIES: ICD-10-CM

## 2024-08-13 DIAGNOSIS — A53.9 SYPHILIS: ICD-10-CM

## 2024-08-13 DIAGNOSIS — E55.9 VITAMIN D DEFICIENCY: ICD-10-CM

## 2024-08-13 DIAGNOSIS — B20 AIDS (ACQUIRED IMMUNODEFICIENCY SYNDROME), CD4 <=200/<=14%: ICD-10-CM

## 2024-08-13 DIAGNOSIS — B20 AIDS (ACQUIRED IMMUNODEFICIENCY SYNDROME), CD4 <=200/<=14%: Primary | ICD-10-CM

## 2024-08-13 DIAGNOSIS — R74.8 ELEVATED LIVER ENZYMES: ICD-10-CM

## 2024-08-13 LAB
25(OH)D3+25(OH)D2 SERPL-MCNC: 12 NG/ML (ref 30–80)
ALBUMIN SERPL-MCNC: 3.8 G/DL (ref 3.5–5)
ALBUMIN/GLOB SERPL: 0.9 RATIO (ref 1.1–2)
ALP SERPL-CCNC: 49 UNIT/L (ref 40–150)
ALT SERPL-CCNC: 23 UNIT/L (ref 0–55)
ANION GAP SERPL CALC-SCNC: 8 MEQ/L
AST SERPL-CCNC: 22 UNIT/L (ref 5–34)
BASOPHILS # BLD AUTO: 0.01 X10(3)/MCL
BASOPHILS NFR BLD AUTO: 0.2 %
BILIRUB SERPL-MCNC: 1.5 MG/DL
BUN SERPL-MCNC: 12.2 MG/DL (ref 8.9–20.6)
CALCIUM SERPL-MCNC: 9.7 MG/DL (ref 8.4–10.2)
CHLORIDE SERPL-SCNC: 106 MMOL/L (ref 98–107)
CO2 SERPL-SCNC: 25 MMOL/L (ref 22–29)
CREAT SERPL-MCNC: 1.14 MG/DL (ref 0.73–1.18)
CREAT/UREA NIT SERPL: 11
EOSINOPHIL # BLD AUTO: 0.04 X10(3)/MCL (ref 0–0.9)
EOSINOPHIL NFR BLD AUTO: 0.8 %
ERYTHROCYTE [DISTWIDTH] IN BLOOD BY AUTOMATED COUNT: 17.6 % (ref 11.5–17)
GFR SERPLBLD CREATININE-BSD FMLA CKD-EPI: >60 ML/MIN/1.73/M2
GLOBULIN SER-MCNC: 4.3 GM/DL (ref 2.4–3.5)
GLUCOSE SERPL-MCNC: 86 MG/DL (ref 74–100)
HAV IGM SERPL QL IA: NONREACTIVE
HBV CORE IGM SERPL QL IA: NONREACTIVE
HBV SURFACE AG SERPL QL IA: NONREACTIVE
HCT VFR BLD AUTO: 46.5 % (ref 42–52)
HCV AB SERPL QL IA: NONREACTIVE
HGB BLD-MCNC: 15 G/DL (ref 14–18)
IMM GRANULOCYTES # BLD AUTO: 0.01 X10(3)/MCL (ref 0–0.04)
IMM GRANULOCYTES NFR BLD AUTO: 0.2 %
LYMPHOCYTES # BLD AUTO: 1.88 X10(3)/MCL (ref 0.6–4.6)
LYMPHOCYTES NFR BLD AUTO: 35.9 %
MCH RBC QN AUTO: 24 PG (ref 27–31)
MCHC RBC AUTO-ENTMCNC: 32.3 G/DL (ref 33–36)
MCV RBC AUTO: 74.3 FL (ref 80–94)
MONOCYTES # BLD AUTO: 0.5 X10(3)/MCL (ref 0.1–1.3)
MONOCYTES NFR BLD AUTO: 9.5 %
NEUTROPHILS # BLD AUTO: 2.8 X10(3)/MCL (ref 2.1–9.2)
NEUTROPHILS NFR BLD AUTO: 53.4 %
NRBC BLD AUTO-RTO: 0 %
PLATELET # BLD AUTO: 244 X10(3)/MCL (ref 130–400)
PMV BLD AUTO: 9.7 FL (ref 7.4–10.4)
POTASSIUM SERPL-SCNC: 4.3 MMOL/L (ref 3.5–5.1)
PROT SERPL-MCNC: 8.1 GM/DL (ref 6.4–8.3)
RBC # BLD AUTO: 6.26 X10(6)/MCL (ref 4.7–6.1)
RPR SER QL: REACTIVE
RPR SER-TITR: ABNORMAL {TITER}
SODIUM SERPL-SCNC: 139 MMOL/L (ref 136–145)
T PALLIDUM AB SER QL: REACTIVE
WBC # BLD AUTO: 5.24 X10(3)/MCL (ref 4.5–11.5)

## 2024-08-13 PROCEDURE — 80074 ACUTE HEPATITIS PANEL: CPT

## 2024-08-13 PROCEDURE — 82306 VITAMIN D 25 HYDROXY: CPT

## 2024-08-13 PROCEDURE — 87536 HIV-1 QUANT&REVRSE TRNSCRPJ: CPT

## 2024-08-13 PROCEDURE — 36415 COLL VENOUS BLD VENIPUNCTURE: CPT

## 2024-08-13 PROCEDURE — 86780 TREPONEMA PALLIDUM: CPT

## 2024-08-13 PROCEDURE — 86360 T CELL ABSOLUTE COUNT/RATIO: CPT

## 2024-08-13 PROCEDURE — 99214 OFFICE O/P EST MOD 30 MIN: CPT | Mod: S$PBB,,, | Performed by: NURSE PRACTITIONER

## 2024-08-13 PROCEDURE — 99214 OFFICE O/P EST MOD 30 MIN: CPT | Mod: PBBFAC | Performed by: NURSE PRACTITIONER

## 2024-08-13 PROCEDURE — 85025 COMPLETE CBC W/AUTO DIFF WBC: CPT

## 2024-08-13 PROCEDURE — 86592 SYPHILIS TEST NON-TREP QUAL: CPT

## 2024-08-13 PROCEDURE — 80053 COMPREHEN METABOLIC PANEL: CPT

## 2024-08-13 RX ORDER — ERGOCALCIFEROL 1.25 MG/1
50000 CAPSULE ORAL
Qty: 12 CAPSULE | Refills: 1 | Status: SHIPPED | OUTPATIENT
Start: 2024-08-13

## 2024-08-13 RX ORDER — BICTEGRAVIR SODIUM, EMTRICITABINE, AND TENOFOVIR ALAFENAMIDE FUMARATE 50; 200; 25 MG/1; MG/1; MG/1
1 TABLET ORAL DAILY
Qty: 30 TABLET | Refills: 3 | Status: SHIPPED | OUTPATIENT
Start: 2024-08-13

## 2024-08-13 RX ORDER — SULFAMETHOXAZOLE AND TRIMETHOPRIM 800; 160 MG/1; MG/1
1 TABLET ORAL DAILY
Qty: 30 TABLET | Refills: 3 | Status: SHIPPED | OUTPATIENT
Start: 2024-08-13

## 2024-08-13 NOTE — PROGRESS NOTES
Patient ID: William Girard 33 y.o.     Chief Complaint:   Chief Complaint   Patient presents with    Followup HIV     Denies problems        HPI:    8/13/24  William Girard is a 34 y/o AAM here for advanced HIV f/u. Pt is accompanied by a [facility] guard as he is currently incarcerated. Dx 5/8/22. [facility]. Reports adherence to Biktarvy and Bactrim DS. States he is tolerating the meds well.  Reports non-adherence to Ergocalciferol. Denies fever, headache, chills, visual problems, N/V/D, SOB, cough, chest pain or abd pain. Reviewed labs from 5/13/24 HIV , CD4 166, RPR 4 dils.  2/2024 Vit D 9. Hx of syphilis. Treated with Bicillin LA 2.4 million units x 3  (11/21/22, 11/28/22, and 12/6/22).  Eye appt scheduled for 12/24/24. Multiple dental caries noted on exam. Pt will need to follow up with a dentist.      5/13/24  William Girard is a 34 y/o AAM here for advanced HIV follow up.  Pt is currently incarcerated at [facility] and is accompanied by a guard.  Dx 5/8/22. [facility]. He reports adherence to Biktarvy and Bactrim DS since restarting after our last visit. Tells me he is not taking a Vit D supplement.  Pt denies fever, headache, chills, visual problems, N/V/D, SOB, cough, chest pain or abd pain. Reviewed labs from 2/22/24 HIV VL 3.14 million, K103N, V106I, RPR 8 dils, Vit D 9.9. 12/16/22 CD4 129.  Hx of syphilis in which he was treated with Bicillin LA 2.4 million units x 3  (11/21/22, 11/28/22, and 12/6/22). Pt is due for a Tdap vaccine and is amenable. Eye appt scheduled for 6/10/24.     2/22/24  William Girard is a 34 y/o AAM here for AIDS f/u. Pt is currently incarcerated at [facility] and is accompanied by a guard.  Dx 5/8/22. MSF.  Pt reports non-adherence to Biktarvy x 9 months.  He states he attended his appointment 12/16/22 and then was incarcerated shortly after and has been unable to attend an appointment.  Pt denies fever, headache, chills, visual  problems, N/V/D, SOB, cough, chest pain or abd pain. He complains of rectal pain that has been present >6 months.  Pt states he has a hemorrhoid that is very painful and when he has a bowel movement he sometimes notices some blood.  He is not currently taking anything for the hemorrhoid. Reviewed labs from 12/16/22 HIV VL [address] and CD4 129.  Pt will need updated labs today. He has hx of syphilis in which he was treated with Bicillin LA 2.4 million units x 3  (11/21/22, 11/28/22, and 12/6/22). He is due for several vaccines, but will plan to hold at this time as CD4 count is low.  Pt is a former smoker.       12/16/22  William Shaji is a 31 y/o AAM here for initial HIV visit.  Dx 5/8/22 while inpatient at [facility] for an anal abscess.  Pt was referred to [facility], but did not attend intake appt until 10/10/22. Then he missed several appts.  He presents today alone.  States he is having a hard time processing this diagnosis. Denies HI or SI. Tells me he has only told one friend that no one in his family is aware of his diagnosis.  Pt is bisexual but prefers men.  Anal receptive sex. >50 partners in his lifetime. No partner at present. Hx of drug use. Tells me he uses crystal meth and THC.  Smokes approx 1 pack of cigarettes per week.  Past hx of syphilis in which patient was treated with Bicillin LA 2.4 million units through the health unit 11/21/22, 11/28/22, and 12/6/22.  Pt also tells he that he has been treated for gonorrhea and trichomoniasis in the past. He denies fever, headache, chills, visual problems, N/V, SOB, cough, chest pain or abd pain. Complains of frequent diarrhea for the last 2 weeks. States every time he eats he has watery diarrhea. Pt also complains of some anal pain intermittently similar to the pain he was having with the anal abscess. He will need to be imaged to make sure this has resolved. Pretreatment labs done 10/10/22 HIV ,000 SC Rilpivirine and Doravarine, HR Sustiva with K103N and V106I  mutation, CD4 268, QG neg, CMV IGG +, toxo IGG neg, crypto ag neg, syphilis AB +, RPR 1:8, Vit D 29.7, TSH 1.1325, Hep B surface ag neg, Hep B surface ab NR, Hep B core ab NR, UDS + THC and amphetamines. Pt will need a few updated labs today and will need stool studies as well. He is due for a COVID and flu vaccines today, but is not amenable. Pt is due for PCV20. He is amenable.           Past Medical History:   Diagnosis Date    HIV infection         History reviewed. No pertinent surgical history.     Social History     Socioeconomic History    Marital status: Single   Tobacco Use    Smoking status: Light Smoker     Current packs/day: 0.25     Average packs/day: 0.3 packs/day for 12.6 years (3.2 ttl pk-yrs)     Types: Cigarettes     Start date: 2012    Smokeless tobacco: Never   Substance and Sexual Activity    Alcohol use: Not Currently     Alcohol/week: 1.0 - 3.0 standard drink of alcohol     Types: 1 - 3 Drinks containing 0.5 oz of alcohol per week    Drug use: Yes     Types: Methamphetamines, Marijuana     Comment: clean since in detention    Sexual activity: Yes     Partners: Female, Male     Birth control/protection: Condom        Family History   Problem Relation Name Age of Onset    No Known Problems Mother      No Known Problems Father      No Known Problems Sister      No Known Problems Brother          Review of patient's allergies indicates:   Allergen Reactions    Ibuprofen Anaphylaxis        Immunization History   Administered Date(s) Administered    DTaP 1990, 01/24/1991, 03/14/1991, 02/12/1992, 09/22/1994    HIB 05/29/1991, 09/06/1991, 02/12/1992    HPV Quadrivalent 06/22/2010    Hepatitis B 08/10/2001, 09/14/2001, 12/04/2001    IPV 1990, 01/24/1991, 02/12/1992, 09/22/1994    MMR 02/12/1992, 09/22/1994    Meningococcal Conjugate (MCV4P) 04/14/2009    PPD Test 08/29/2001    Pneumococcal Conjugate - 20 Valent 12/16/2022    Tdap 04/14/2009, 05/13/2024        Review of Systems   Constitutional:  "Negative.    HENT: Negative.     Eyes: Negative.    Respiratory: Negative.     Cardiovascular: Negative.    Gastrointestinal: Negative.    Genitourinary: Negative.    Musculoskeletal: Negative.    Skin: Negative.    Neurological: Negative.    Endo/Heme/Allergies: Negative.    Psychiatric/Behavioral: Negative.     All other systems reviewed and are negative.         Objective:      /66 (BP Location: Left arm, Patient Position: Sitting, BP Method: Large (Automatic))   Pulse 62   Temp 97.5 °F (36.4 °C) (Oral)   Resp 12   Ht 6' 2" (1.88 m)   Wt 105 kg (231 lb 7.7 oz)   BMI 29.72 kg/m²      Physical Exam  Vitals reviewed.   Constitutional:       General: He is not in acute distress.     Appearance: Normal appearance. He is obese.   HENT:      Head: Normocephalic.      Right Ear: External ear normal.      Left Ear: External ear normal.      Nose: Nose normal.      Mouth/Throat:      Mouth: Mucous membranes are moist.      Pharynx: Oropharynx is clear.   Eyes:      General: No scleral icterus.     Extraocular Movements: Extraocular movements intact.      Conjunctiva/sclera: Conjunctivae normal.      Pupils: Pupils are equal, round, and reactive to light.   Cardiovascular:      Rate and Rhythm: Normal rate and regular rhythm.      Pulses: Normal pulses.      Heart sounds: Normal heart sounds.   Pulmonary:      Effort: Pulmonary effort is normal. No respiratory distress.      Breath sounds: Normal breath sounds.   Abdominal:      General: Bowel sounds are normal. There is no distension.      Palpations: Abdomen is soft. There is no mass.      Tenderness: There is no abdominal tenderness. There is no right CVA tenderness or left CVA tenderness.      Hernia: No hernia is present.   Musculoskeletal:         General: No tenderness or signs of injury. Normal range of motion.      Cervical back: Normal range of motion and neck supple.      Right lower leg: No edema.      Left lower leg: No edema.   Lymphadenopathy:     "  Cervical: No cervical adenopathy.   Skin:     General: Skin is warm and dry.      Capillary Refill: Capillary refill takes less than 2 seconds.      Findings: No erythema or lesion.   Neurological:      General: No focal deficit present.      Mental Status: He is alert and oriented to person, place, and time. Mental status is at baseline.   Psychiatric:         Mood and Affect: Mood normal.         Behavior: Behavior normal.         Thought Content: Thought content normal.         Judgment: Judgment normal.          Labs:   Lab Results   Component Value Date    WBC 5.24 08/13/2024    HGB 15.0 08/13/2024    HCT 46.5 08/13/2024    MCV 74.3 (L) 08/13/2024     08/13/2024       CMP  Sodium   Date Value Ref Range Status   08/13/2024 139 136 - 145 mmol/L Final     Potassium   Date Value Ref Range Status   08/13/2024 4.3 3.5 - 5.1 mmol/L Final     Chloride   Date Value Ref Range Status   08/13/2024 106 98 - 107 mmol/L Final     CO2   Date Value Ref Range Status   08/13/2024 25 22 - 29 mmol/L Final     Blood Urea Nitrogen   Date Value Ref Range Status   08/13/2024 12.2 8.9 - 20.6 mg/dL Final     Creatinine   Date Value Ref Range Status   08/13/2024 1.14 0.73 - 1.18 mg/dL Final     Calcium   Date Value Ref Range Status   08/13/2024 9.7 8.4 - 10.2 mg/dL Final     Albumin   Date Value Ref Range Status   08/13/2024 3.8 3.5 - 5.0 g/dL Final     Bilirubin Total   Date Value Ref Range Status   08/13/2024 1.5 <=1.5 mg/dL Final     ALP   Date Value Ref Range Status   08/13/2024 49 40 - 150 unit/L Final     AST   Date Value Ref Range Status   08/13/2024 22 5 - 34 unit/L Final     ALT   Date Value Ref Range Status   08/13/2024 23 0 - 55 unit/L Final     eGFR   Date Value Ref Range Status   08/13/2024 >60 mL/min/1.73/m2 Final     Lab Results   Component Value Date    TSH 0.629 02/22/2024     Hep C Ab Interp   Date Value Ref Range Status   08/13/2024 Nonreactive Nonreactive Final     Syphilis Antibody   Date Value Ref Range  Status   08/13/2024 Reactive (A) Nonreactive, Equivocal Final     RPR   Date Value Ref Range Status   05/13/2024 Reactive (A) Non-Reactive Final     RPR Titer   Date Value Ref Range Status   05/13/2024 4 dils (A) (none) Final     Cholesterol Total   Date Value Ref Range Status   02/22/2024 164 <=200 mg/dL Final     HDL Cholesterol   Date Value Ref Range Status   02/22/2024 41 35 - 60 mg/dL Final     Triglyceride   Date Value Ref Range Status   02/22/2024 100 34 - 140 mg/dL Final     Cholesterol/HDL Ratio   Date Value Ref Range Status   02/22/2024 4 0 - 5 Final     Very Low Density Lipoprotein   Date Value Ref Range Status   02/22/2024 20  Final     LDL Cholesterol   Date Value Ref Range Status   02/22/2024 103.00 50.00 - 140.00 mg/dL Final     Vitamin D   Date Value Ref Range Status   08/13/2024 12 (L) 30 - 80 ng/mL Final     Results for orders placed or performed in visit on 12/16/22   CD4 Lymphocytes   Result Value Ref Range    Patient Age 32     WBC Absolute 7,500 4,500 - 11,500 /mm3    Lymph Percent 21 (L) 28 - 48 %    Lymph Absolute 1,575 1,260 - 5,520 x10(3)/mcL    CD4 % 8.2 %    CD4 Absolute 129.15 (L) 589 - 1,505 unit/L    T Cell Interp       Normal absolute lymphocyte count with decreased absolute CD4+ lymphocyte count.    Stephen Pardo M.D.        Results for orders placed or performed in visit on 05/13/24   HIV-1 RNA, Quantitative, PCR with Reflex to Genotype   Result Value Ref Range    HIV-1 RNA Detect/Quant, P 267 (A) Undetected copies/mL     Results for orders placed or performed in visit on 02/22/24   Quantiferon Gold TB   Result Value Ref Range    QuantiFERON-Tb Gold Plus Result Negative Negative    TB1 Ag minus Nil Result 0.00 IU/mL    TB2 Ag minus Nil Result 0.00 IU/mL    Mitogen minus Nil Result 4.94 IU/mL    Nil Result 0.03 IU/mL     Results for orders placed or performed in visit on 12/16/22   C.trach/N.gonor AMP RNA    Specimen: Throat   Result Value Ref Range    Chlamydia trachomatis  amplified RNA Negative Negative    Neisseria gonorrhoeae amplified RNA Negative Negative    Source oral     SOURCE: oral      Results for orders placed or performed in visit on 02/22/24   Urinalysis   Result Value Ref Range    Color, UA Yellow Yellow, Light-Yellow, Dark Yellow, Tania, Straw    Appearance, UA Clear Clear    Specific Gravity, UA 1.017 1.005 - 1.030    pH, UA 6.0 5.0 - 8.5    Protein, UA Negative Negative    Glucose, UA Normal Negative, Normal    Ketones, UA Negative Negative    Blood, UA Negative Negative    Bilirubin, UA Negative Negative    Urobilinogen, UA 1+ (A) 0.2, 1.0, Normal    Nitrites, UA Negative Negative    Leukocyte Esterase, UA Negative Negative    WBC, UA 0-5 None Seen, 0-2, 3-5, 0-5 /HPF    Bacteria, UA None Seen None Seen /HPF    Squamous Epithelial Cells, UA None Seen None Seen /HPF    Hyaline Casts, UA None Seen None Seen /lpf    RBC, UA 0-5 None Seen, 0-2, 3-5, 0-5 /HPF       Imaging: Reviewed most recent relevant imaging studies available, notable results highlighted in this note    Medications:     Current Outpatient Medications   Medication Instructions    eldetqlii-kkgquclx-kurpdui ala (BIKTARVY) -25 mg (25 kg or greater) 1 tablet, Oral, Daily    ergocalciferol (VITAMIN D2) 50,000 Units, Oral, Every 7 days    sulfamethoxazole-trimethoprim 800-160mg (BACTRIM DS) 800-160 mg Tab 1 tablet, Oral, Daily       Assessment:       Problem List Items Addressed This Visit          ID    Syphilis    Relevant Orders    SYPHILIS ANTIBODY (WITH REFLEX RPR) (Completed)     Other Visit Diagnoses       AIDS (acquired immunodeficiency syndrome), CD4 <=200/<=14%    -  Primary    Relevant Medications    whjsgivnc-fbzybhhy-keelwpf ala (BIKTARVY) -25 mg (25 kg or greater)    sulfamethoxazole-trimethoprim 800-160mg (BACTRIM DS) 800-160 mg Tab    Other Relevant Orders    HIV-1 RNA, Quantitative, PCR with Reflex to Genotype    CD4 T-[place] Cells    CBC Auto Differential (Completed)     Comprehensive Metabolic Panel (Completed)    Vitamin D deficiency        Relevant Medications    ergocalciferol (VITAMIN D2) 50,000 unit Cap    Other Relevant Orders    Vitamin D (Completed)    Dental caries                   Plan:      AIDS (acquired immunodeficiency syndrome), CD4 <=200/<=14%  -     vwhihzeks-fexmoics-hfopmgd ala (BIKTARVY) -25 mg (25 kg or greater); Take 1 tablet by mouth once daily.  Dispense: 30 tablet; Refill: 3  -     sulfamethoxazole-trimethoprim 800-160mg (BACTRIM DS) 800-160 mg Tab; Take 1 tablet by mouth once daily.  Dispense: 30 tablet; Refill: 3  -     HIV-1 RNA, Quantitative, PCR with Reflex to Genotype; Future; Expected date: 08/13/2024  -     CD4 T-[place] Cells; Future; Expected date: 08/13/2024  -     CBC Auto Differential; Future; Expected date: 08/13/2024  -     Comprehensive Metabolic Panel; Future; Expected date: 08/13/2024  Adherence and sexual health counseling done.  Use condoms for all sexual encounters.  Blood precautions.   Continue Biktarvy 1 po q day and Bactrim DS 1 po q day as prescribed.  Labs today  RTC 4 months with Julia for an in office visit     Vitamin D deficiency  -     ergocalciferol (VITAMIN D2) 50,000 unit Cap; Take 1 capsule (50,000 Units total) by mouth every 7 days.  Dispense: 12 capsule; Refill: 1  -     Vitamin D; Future; Expected date: 08/13/2024  Restart Ergocalciferol 50,000 units once weekly. Pt educated on the importance of Vit D to bone and organ health.     Syphilis  -     SYPHILIS ANTIBODY (WITH REFLEX RPR); Future; Expected date: 08/13/2024  Treated with Bicillin LA 2.4 million units x 3  (11/21/22, 11/28/22, and 12/6/22).     Dental caries  Pt needs to follow up with a dentist for teeth extraction

## 2024-08-14 LAB
CD3 CELLS # BLD: 1303 CELLS/MCL (ref 550–2202)
CD3 CELLS NFR BLD: 75 % (ref 58–86)
CD3+CD4+ CELLS # BLD: 247 CELLS/MCL (ref 365–1437)
CD3+CD4+ CELLS NFR BLD: 14 % (ref 32–64)
CD3+CD4+ CELLS/CD3+CD8+ CLL BLD: 0.3 %
CD3+CD8+ CELLS # BLD: 928 CELLS/MCL (ref 171–846)
CD3+CD8+ CELLS NFR BLD: 53 % (ref 15–40)
CD45 CELLS # BLD: 1.75 THOU/MCL (ref 0.82–2.84)

## 2024-08-15 LAB — HIV1 RNA # PLAS NAA DL=20: 60 COPIES/ML

## 2024-09-27 ENCOUNTER — OFFICE VISIT (OUTPATIENT)
Dept: OPHTHALMOLOGY | Facility: CLINIC | Age: 34
End: 2024-09-27

## 2024-09-27 VITALS — WEIGHT: 231.5 LBS | HEIGHT: 74 IN | BODY MASS INDEX: 29.71 KG/M2

## 2024-09-27 DIAGNOSIS — B20 AIDS (ACQUIRED IMMUNODEFICIENCY SYNDROME), CD4 <=200/<=14%: ICD-10-CM

## 2024-09-27 DIAGNOSIS — B20 HUMAN IMMUNODEFICIENCY VIRUS (HIV): Primary | ICD-10-CM

## 2024-09-27 DIAGNOSIS — H40.003 GLAUCOMA SUSPECT OF BOTH EYES: ICD-10-CM

## 2024-09-27 PROCEDURE — 99213 OFFICE O/P EST LOW 20 MIN: CPT | Mod: PBBFAC,PN

## 2024-09-27 RX ORDER — PHENYLEPH/TROPICAMIDE IN WATER 2.5 %-1 %
1 DROPS OPHTHALMIC (EYE) ONCE
Status: COMPLETED | OUTPATIENT
Start: 2024-09-27 | End: 2024-09-27

## 2024-09-27 RX ADMIN — Medication 1 DROP: at 08:09

## 2024-09-27 NOTE — PROGRESS NOTES
Assessment /Plan     For exam results, see Encounter Report.    AIDS (acquired immunodeficiency syndrome), CD4 <=200/<=14%  -     Ambulatory referral/consult to Ophthalmology      OCT RNFL 9/27/24  OD: 89, all green  OS: 79, S red, rest green    OCT Mac 9/27/24  OD: Normal foveal contour, no IRF/SRF  OS: Normal foveal contour, no IRF/SRF    1. HIV without Retinopathy  2. Positive RPR/FTA  - CD4 247, Viral count 60 - 8/13/2024  - Biktarvy restarted 8/13/24  - Bactrim prophylaxis  - Treated with Bicillin LA 2.4 million units x 3 (11/21/22, 11/28/22, and 12/6/22)   - Denies flashes/floaters/curtains/veils, vision changes  - OCT Mac normal  - Fundus photos taken today  - RTC in 6 months repeat DFE    3. Glaucoma Suspect  - C/D 0.5 // 0.45 on exam  - OCT RNFL with superior thinning OS  - no family history  - RTC in 3-4 months CCT and HVF    4. Dry Eye Syndrome  - Inf PEEs OU  - ATs 2-4x/day PRN    ============================================================  Instructions to Facility:    - START ARTIFICAL TEARS 2 TIMES PER DAY AS NEEDED  - RTC in 3-4 months CCT and HVF  ============================================================

## 2024-10-28 DIAGNOSIS — B20 AIDS (ACQUIRED IMMUNODEFICIENCY SYNDROME), CD4 <=200/<=14%: ICD-10-CM

## 2024-10-28 RX ORDER — BICTEGRAVIR SODIUM, EMTRICITABINE, AND TENOFOVIR ALAFENAMIDE FUMARATE 50; 200; 25 MG/1; MG/1; MG/1
1 TABLET ORAL
Qty: 30 TABLET | Refills: 1 | Status: SHIPPED | OUTPATIENT
Start: 2024-10-28

## 2024-11-22 DIAGNOSIS — B20 AIDS (ACQUIRED IMMUNODEFICIENCY SYNDROME), CD4 <=200/<=14%: ICD-10-CM

## 2024-11-22 RX ORDER — BICTEGRAVIR SODIUM, EMTRICITABINE, AND TENOFOVIR ALAFENAMIDE FUMARATE 50; 200; 25 MG/1; MG/1; MG/1
1 TABLET ORAL
Qty: 30 TABLET | Refills: 1 | Status: SHIPPED | OUTPATIENT
Start: 2024-11-22